# Patient Record
Sex: MALE | Race: WHITE
[De-identification: names, ages, dates, MRNs, and addresses within clinical notes are randomized per-mention and may not be internally consistent; named-entity substitution may affect disease eponyms.]

---

## 2017-09-14 ENCOUNTER — HOSPITAL ENCOUNTER (EMERGENCY)
Dept: HOSPITAL 47 - EC | Age: 72
Discharge: HOME | End: 2017-09-14
Payer: MEDICARE

## 2017-09-14 VITALS — TEMPERATURE: 98 F | HEART RATE: 72 BPM | DIASTOLIC BLOOD PRESSURE: 81 MMHG | SYSTOLIC BLOOD PRESSURE: 148 MMHG

## 2017-09-14 VITALS — RESPIRATION RATE: 18 BRPM

## 2017-09-14 DIAGNOSIS — Z91.030: ICD-10-CM

## 2017-09-14 DIAGNOSIS — Z79.01: ICD-10-CM

## 2017-09-14 DIAGNOSIS — Z87.891: ICD-10-CM
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
## 2017-09-14 DIAGNOSIS — S62.660A: Primary | ICD-10-CM

## 2017-09-14 PROCEDURE — 99283 EMERGENCY DEPT VISIT LOW MDM: CPT

## 2017-09-14 NOTE — ED
Upper Extremity HPI





- General


Chief Complaint: Extremity Injury, Upper


Stated Complaint: R hand injury


Time Seen by Provider: 09/14/17 17:31


Source: patient, RN notes reviewed, old records reviewed


Mode of arrival: ambulatory


Limitations: no limitations





- History of Present Illness


Initial Comments: 





Serum present emergency department with chief complaint of right index finger 

injury.  Patient reports that he was using a circular saw and cut the distal 

tip of his finger off.  Patient reports that he was also told he has an open 

distal phalanx fracture.  Patient was seen originally at [facility].  They did give him an IV, updated his tetanus.  He was wrapped in 

dressing and then transferred to this facility.  Patient reports that he is 

supposed to see Dr. Persaud.  Patient also relates that he is currently on 

Coumadin.  Patient reports that he has numbness and tingling to the distal 

finger.  Tonight denies any other injury to the hand.  Patient reports that he 

is left-handed.





- Related Data


 Previous Rx's











 Medication  Instructions  Recorded


 


HYDROcodone/APAP 10-325MG [Norco 1 tab PO Q6H PRN #15 tab 09/14/17





]  











 Allergies











Allergy/AdvReac Type Severity Reaction Status Date / Time


 


bee venom protein (honey bee) Allergy  Swelling Verified 09/14/17 17:26














Review of Systems


ROS Statement: 


Those systems with pertinent positive or pertinent negative responses have been 

documented in the HPI.





ROS Other: All systems not noted in ROS Statement are negative.





Past Medical History


Past Medical History: Diabetes Mellitus


History of Any Multi-Drug Resistant Organisms: None Reported


Additional Past Surgical History / Comment(s): mitral valve repair


Past Psychological History: No Psychological Hx Reported


Smoking Status: Former smoker


Past Alcohol Use History: Daily


Past Drug Use History: None Reported





General Exam





- General Exam Comments


Initial Comments: 





Is a 71-year-old male.  Patient does not appear to be in any acute distress.


Limitations: no limitations


General appearance: alert, in no apparent distress


Head exam: Present: atraumatic, normocephalic, normal inspection


Eye exam: Present: normal appearance, PERRL, EOMI.  Absent: scleral icterus, 

conjunctival injection, periorbital swelling


ENT exam: Present: normal exam, mucous membranes moist


Neck exam: Present: normal inspection.  Absent: tenderness, meningismus, 

lymphadenopathy


Respiratory exam: Present: normal lung sounds bilaterally.  Absent: respiratory 

distress, wheezes, rales, rhonchi, stridor


Cardiovascular Exam: Present: regular rate, normal rhythm, normal heart sounds.

  Absent: systolic murmur, diastolic murmur, rubs, gallop, clicks


GI/Abdominal exam: Present: soft, normal bowel sounds.  Absent: distended, 

tenderness, guarding, rebound, rigid


Extremities exam: Present: normal inspection, full ROM, normal capillary 

refill.  Absent: tenderness, pedal edema, joint swelling, calf tenderness


  ** Right


Upper Arm exam: Present: normal inspection, full ROM


Elbow exam: Present: normal inspection, full ROM


Forearm Wrist exam: Present: normal inspection, full ROM


Hand Wrist exam: Absent: normal inspection


Hand L/R Front: 


  __________________________














  __________________________





 1 - laceration (circumferential laceration, Evdince of nail bed involvement.)





Neuro motor exam: Present: wrist extension intact, thumb opposition intact, 

thumb IP flexion intact, thumb adduction intact, fingers 2-5 abduction intact


Back exam: Present: normal inspection


Neurological exam: Present: alert, oriented X3, CN II-XII intact


Psychiatric exam: Present: normal affect, normal mood


Skin exam: Present: warm, dry, intact, normal color.  Absent: rash





Course


 Vital Signs











  09/14/17





  17:21


 


Temperature 97.2 F L


 


Pulse Rate 69


 


Respiratory 18





Rate 


 


Blood Pressure 174/91


 


O2 Sat by Pulse 99





Oximetry 














Medical Decision Making





- Medical Decision Making





Physical 71-year-old male with history of diabetes, A. fib presents emergency 

Department with  right second finger laceration after he cut it on a circular 

saw.  Patient was originally seen at [facility].  He was then 

transferred to this hospital for evaluation by orthopedic hand specialist.  On 

initial transfer physician assistant for Dr. bunch was contacted and accepted 

the transfer.  Apparently the physician assistant and called the [facility] 

and [place] and told him just to come to the office tomorrow morning.  The 

patient did not know that he was supposed to just go to the office tomorrow 

morning and then was sent to the emergency department.  Upon arriving to the 

emergency department I did advise the wound.  There is a circumferential 

laceration involving the nailbed, x-rays were reviewed and show a nondisplaced 

distal phalanx fracture.  Patient received Keflex, and teeth.  Patient's wound 

was then redressed and a wet-to-dry dressing.  We did call the orthopedic 

associates, Dr. Remy stated that he is only supposed to see the office 

tomorrow morning.  He will have surgery at that time.  He wants the patient to 

hold off on his Coumadin tonight, as well as have pain medication, and be 

nothing by mouth after midnight.  I discussed all these situations with the 

patient.  They are somewhat frustrated that they had to come to the emergency 

department with outstanding orthopedic physician.  I discussed with them that 

they need to keep the hand wrapped, and all further instructions.  Patient is 

understanding of treatment plan will comply.  Return parameters were discussed.

  





Disposition


Clinical Impression: 


 Open fracture of distal phalanx, Warfarin anticoagulation





Disposition: HOME SELF-CARE


Condition: Good


Instructions:  Finger Fracture (ED)


Additional Instructions: 


Patient needs to be nothing by mouth after midnight.  Hold the Coumadin.  Take 

all other medications with a small sip of water.  Patient should call Dr. bunch's office first thing in the morning to find a 1 year appointment is.  

Return to the emergency department if any alarming signs or.  Recommended 

sleeping in a recliner chair with her hand elevated.


Prescriptions: 


HYDROcodone/APAP 10-325MG [Norco ] 1 tab PO Q6H PRN #15 tab


 PRN Reason: Pain


Referrals: 


Seth Sanchez MD [Primary Care Provider] - 1-2 days


Palmer Persaud DO [Doctor of Osteopathic Medicine] - 1-2 days


Time of Disposition: 18:28

## 2017-09-29 ENCOUNTER — HOSPITAL ENCOUNTER (OUTPATIENT)
Dept: HOSPITAL 47 - ORWHC2ENDO | Age: 72
Discharge: HOME | End: 2017-09-29
Payer: MEDICARE

## 2017-09-29 VITALS — HEART RATE: 55 BPM | SYSTOLIC BLOOD PRESSURE: 124 MMHG | DIASTOLIC BLOOD PRESSURE: 91 MMHG

## 2017-09-29 VITALS — TEMPERATURE: 98 F

## 2017-09-29 VITALS — RESPIRATION RATE: 16 BRPM

## 2017-09-29 VITALS — BODY MASS INDEX: 28.3 KG/M2

## 2017-09-29 DIAGNOSIS — H40.9: ICD-10-CM

## 2017-09-29 DIAGNOSIS — Z12.11: Primary | ICD-10-CM

## 2017-09-29 DIAGNOSIS — I10: ICD-10-CM

## 2017-09-29 DIAGNOSIS — D12.2: ICD-10-CM

## 2017-09-29 DIAGNOSIS — F17.200: ICD-10-CM

## 2017-09-29 DIAGNOSIS — Z79.01: ICD-10-CM

## 2017-09-29 DIAGNOSIS — D12.0: ICD-10-CM

## 2017-09-29 DIAGNOSIS — Z79.899: ICD-10-CM

## 2017-09-29 DIAGNOSIS — Z79.84: ICD-10-CM

## 2017-09-29 DIAGNOSIS — E78.5: ICD-10-CM

## 2017-09-29 DIAGNOSIS — I48.91: ICD-10-CM

## 2017-09-29 LAB — GLUCOSE BLD-MCNC: 156 MG/DL (ref 75–99)

## 2017-09-29 PROCEDURE — 88305 TISSUE EXAM BY PATHOLOGIST: CPT

## 2017-09-29 PROCEDURE — 45385 COLONOSCOPY W/LESION REMOVAL: CPT

## 2017-09-29 NOTE — P.PCN
Date of Procedure: 09/29/17


Procedure(s) Performed: 


BRIEF HISTORY: Patient is a 71-year-old pleasant male, scheduled for an 

elective colonoscopy as a part of screening for colorectal neoplasia.





PROCEDURE PERFORMED: Colonoscopy with snare polypectomy. 





PREOPERATIVE DIAGNOSIS: Screening for colon cancer. 





IV sedation per Anesthesia. 





PROCEDURE: After informed consent was obtained, the patient, was brought into 

the endoscopy unit. IV sedation was administered by Anesthesia under continuous 

monitoring.  Digital rectal examination was normal. Initially the Olympus CF-

160 flexible video colonoscope was then inserted in the rectum, gradually 

advanced into the cecum without any difficulty. Careful examination was 

performed as the scope was gradually being withdrawn. Ileocecal valve and the 

appendiceal orifice were visualized and appeared normal.  Prep was excellent. 

Mucosa of the cecum, appeared normal.  There was a 1 and a polyp noted in the 

base of the cecum that was removed by snare polypectomy.  In the ascending 

colon there was a 7 mm sessile polyp removed by snare polypectomy.  The rest of 

the ascending colon, transverse colon, descending colon, sigmoid colon, and 

rectum appeared normal.  In the descending colon there was a 2-3 mm diminutive 

polyp that was removed by biopsy.  Scattered sigmoid diverticula seen.  

Retroflexion was performed in the rectum and small internal hemorrhoids were 

seen. The patient tolerated the procedure well. 





IMPRESSION: 


1 cm cecal polyp status post polypectomy


7 mm ascending colon polyp status post snare polypectomy


2-3 mm diminutive descending colon polyp status post snare polypectomy


Scattered sigmoid diverticulosis.


Small internal hemorrhoids.





RECOMMENDATIONS:  Findings of this examination were discussed with the patient 

as well as his family.  He was advised to follow with the biopsy results.  The 

biopsy shows a tubular adenoma, he can have a repeat colonoscopy in 5 years.

## 2018-01-26 ENCOUNTER — HOSPITAL ENCOUNTER (OUTPATIENT)
Dept: HOSPITAL 47 - LABWHC1 | Age: 73
Discharge: HOME | End: 2018-01-26
Attending: INTERNAL MEDICINE
Payer: MEDICARE

## 2018-01-26 DIAGNOSIS — R06.02: ICD-10-CM

## 2018-01-26 DIAGNOSIS — J98.11: Primary | ICD-10-CM

## 2018-01-26 DIAGNOSIS — J90: ICD-10-CM

## 2018-01-26 LAB
ANION GAP SERPL CALC-SCNC: 10 MMOL/L
BUN SERPL-SCNC: 20 MG/DL (ref 9–20)
CALCIUM SPEC-MCNC: 9.3 MG/DL (ref 8.4–10.2)
CHLORIDE SERPL-SCNC: 97 MMOL/L (ref 98–107)
CO2 SERPL-SCNC: 30 MMOL/L (ref 22–30)
ERYTHROCYTE [DISTWIDTH] IN BLOOD BY AUTOMATED COUNT: 4.52 M/UL (ref 4.3–5.9)
ERYTHROCYTE [DISTWIDTH] IN BLOOD: 12.5 % (ref 11.5–15.5)
GLUCOSE SERPL-MCNC: 131 MG/DL (ref 74–99)
HCT VFR BLD AUTO: 43.9 % (ref 39–53)
HGB BLD-MCNC: 14.4 GM/DL (ref 13–17.5)
MCH RBC QN AUTO: 31.8 PG (ref 25–35)
MCHC RBC AUTO-ENTMCNC: 32.8 G/DL (ref 31–37)
MCV RBC AUTO: 97.1 FL (ref 80–100)
PLATELET # BLD AUTO: 121 K/UL (ref 150–450)
POTASSIUM SERPL-SCNC: 4.5 MMOL/L (ref 3.5–5.1)
SODIUM SERPL-SCNC: 137 MMOL/L (ref 137–145)
WBC # BLD AUTO: 3.9 K/UL (ref 3.8–10.6)

## 2018-01-26 PROCEDURE — 36415 COLL VENOUS BLD VENIPUNCTURE: CPT

## 2018-01-26 PROCEDURE — 85027 COMPLETE CBC AUTOMATED: CPT

## 2018-01-26 PROCEDURE — 80048 BASIC METABOLIC PNL TOTAL CA: CPT

## 2018-01-26 PROCEDURE — 83880 ASSAY OF NATRIURETIC PEPTIDE: CPT

## 2018-01-26 PROCEDURE — 71046 X-RAY EXAM CHEST 2 VIEWS: CPT

## 2018-01-26 NOTE — XR
EXAMINATION TYPE: XR chest 2V

 

DATE OF EXAM: 1/26/2018

 

COMPARISON: Prior chest x-ray November 4, 2015.

 

HISTORY: Cough and shortness of breath for 3 days

 

TECHNIQUE:  Frontal and lateral views of the chest are obtained.

 

FINDINGS: Sternal wires and mediastinal clips are redemonstrated. There is new small right pleural ef
fusion. There is new patchy right lower lung linear atelectasis. The cardiac silhouette size remains 
within normal limits with ectatic descending aorta.   The osseous structures are intact.

 

IMPRESSION:  New small right pleural effusion and right lower lung linear atelectasis

## 2018-02-06 ENCOUNTER — HOSPITAL ENCOUNTER (OUTPATIENT)
Dept: HOSPITAL 47 - RADECHMAIN | Age: 73
Discharge: HOME | End: 2018-02-06
Attending: INTERNAL MEDICINE
Payer: MEDICARE

## 2018-02-06 DIAGNOSIS — I31.3: ICD-10-CM

## 2018-02-06 DIAGNOSIS — I48.0: ICD-10-CM

## 2018-02-06 DIAGNOSIS — Z98.890: ICD-10-CM

## 2018-02-06 DIAGNOSIS — I08.1: Primary | ICD-10-CM

## 2018-02-06 PROCEDURE — 93306 TTE W/DOPPLER COMPLETE: CPT

## 2018-02-07 NOTE — ECHOF
Referral Reason:I48.0 Paroxysmal atrial fibrillation,Z98.890 Other



MEASUREMENTS

--------

HEIGHT: 162.6 cm

WEIGHT: 81.6 kg

BP: 

IVSd:   0.9 cm     (0.6 - 1.1)

LVIDd:   4.4 cm     (3.9 - 5.3)

LVPWd:   1.0 cm     (0.6 - 1.1)

IVSs:   1.4 cm

LVIDs:   3.2 cm

LVPWs:   1.6 cm

LAESV Index (A-L):   35.95 ml/m

Ao Diam:   3.4 cm     (2.0 - 3.7)

AV Cusp:   2.0 cm     (1.5 - 2.6)

LA Diam:   4.2 cm     (2.7 - 3.8)

MV EXCURSION:   13.883 mm     (> 18.000)

MV EF SLOPE:   33 mm/s     (70 - 150)

EPSS:   0.5 cm

MV E Inderjit:   1.20 m/s

MV DecT:   299 ms

MV A Inderjit:   1.78 m/s

MV E/A Ratio:   0.67 

AR PHT:   373 ms

RAP:   5.00 mmHg

RVSP:   21.00 mmHg







FINDINGS

--------

Undetermined rhythm.

This was a technically adequate study.

The left ventricular size is normal.   Left ventricular wall thickness is normal.   Overall left vent
ricular systolic function is low-normal with, an EF between 50 - 55 %.   There is paradoxical/dysyner
gic septal motion consistent with post-operative status.

The right ventricle is normal in size and function.

LA is midly dilated 29-33ml/m2.

The right atrium is normal in size.

The aortic valve is trileaflet, and appears structurally normal. No aortic stenosis or regurgitation.


Mild mitral annular calcification present.   Mild mitral regurgitation is present.   MV Repair.

Mild tricuspid regurgitation present.   The right ventricular systolic pressure, as measured by Doppl
er, is 21.00mmHg.

There is no pulmonic regurgitation present.

The aortic root size is normal.

There is a trivial pericardial effusion present.



CONCLUSIONS

--------

1. Undetermined rhythm.

2. This was a technically adequate study.

3. The left ventricular size is normal.

4. Left ventricular wall thickness is normal.

5. Overall left ventricular systolic function is low-normal with, an EF between 50 - 55 %.

6. There is paradoxical/dysynergic septal motion consistent with post-operative status.

7. LA is midly dilated 29-33ml/m2.

8. The aortic valve is trileaflet, and appears structurally normal. No aortic stenosis or regurgitati
on.

9. Mild mitral annular calcification present.

10. Mild mitral regurgitation is present.

11. MV Repair.

12. Mild tricuspid regurgitation present.

13. The right ventricular systolic pressure, as measured by Doppler, is 21.00mmHg.

14. There is no pulmonic regurgitation present.

15. The aortic root size is normal.

16. There is a trivial pericardial effusion present.





SONOGRAPHER: Bruna Jaime RDCS

## 2018-02-12 ENCOUNTER — HOSPITAL ENCOUNTER (OUTPATIENT)
Dept: HOSPITAL 47 - RADXRMAIN | Age: 73
Discharge: HOME | End: 2018-02-12
Attending: INTERNAL MEDICINE
Payer: MEDICARE

## 2018-02-12 DIAGNOSIS — J90: Primary | ICD-10-CM

## 2018-02-12 PROCEDURE — 71046 X-RAY EXAM CHEST 2 VIEWS: CPT

## 2018-02-12 NOTE — XR
EXAMINATION TYPE: XR chest 2V

 

DATE OF EXAM: 2/12/2018

 

COMPARISON: 1/26/2018

 

HISTORY: 72-year-old male shortness of breath, dyspnea

 

TECHNIQUE:  Frontal and lateral views

 

FINDINGS:  

Heart normal size. Elongation of the thoracic aorta. Median sternotomy wires are present. Moderate-si
zed right pleural effusion, increased from prior. Remaining lungs are clear.

 

 

IMPRESSION:  

Right-sided pleural effusion increased, now moderate in size.

## 2018-02-21 ENCOUNTER — HOSPITAL ENCOUNTER (OUTPATIENT)
Dept: HOSPITAL 47 - RADPROMAIN | Age: 73
Discharge: HOME | End: 2018-02-21
Attending: INTERNAL MEDICINE
Payer: MEDICARE

## 2018-02-21 VITALS — DIASTOLIC BLOOD PRESSURE: 86 MMHG | SYSTOLIC BLOOD PRESSURE: 157 MMHG

## 2018-02-21 VITALS — TEMPERATURE: 97.8 F

## 2018-02-21 VITALS — HEART RATE: 67 BPM | RESPIRATION RATE: 14 BRPM

## 2018-02-21 DIAGNOSIS — J90: Primary | ICD-10-CM

## 2018-02-21 LAB
APTT BLD: 24.9 SEC (ref 22–30)
CELL CNT PNL FLD: 100
DEPRECATED POLYS # FLD: 14 %
INR PPP: 1 (ref ?–1.2)
MONONUC CELLS # FLD: 86 %
NUC CELL # FLD: 600 /UL
PLATELET # BLD AUTO: 221 K/UL (ref 150–450)
PROT FLD-MCNC: 5300 MG/DL
PT BLD: 10 SEC (ref 9–12)

## 2018-02-21 PROCEDURE — 89050 BODY FLUID CELL COUNT: CPT

## 2018-02-21 PROCEDURE — 85610 PROTHROMBIN TIME: CPT

## 2018-02-21 PROCEDURE — 87205 SMEAR GRAM STAIN: CPT

## 2018-02-21 PROCEDURE — 32555 ASPIRATE PLEURA W/ IMAGING: CPT

## 2018-02-21 PROCEDURE — 88341 IMHCHEM/IMCYTCHM EA ADD ANTB: CPT

## 2018-02-21 PROCEDURE — 88108 CYTOPATH CONCENTRATE TECH: CPT

## 2018-02-21 PROCEDURE — 85730 THROMBOPLASTIN TIME PARTIAL: CPT

## 2018-02-21 PROCEDURE — 84157 ASSAY OF PROTEIN OTHER: CPT

## 2018-02-21 PROCEDURE — 88305 TISSUE EXAM BY PATHOLOGIST: CPT

## 2018-02-21 PROCEDURE — 88342 IMHCHEM/IMCYTCHM 1ST ANTB: CPT

## 2018-02-21 PROCEDURE — 83615 LACTATE (LD) (LDH) ENZYME: CPT

## 2018-02-21 PROCEDURE — 85049 AUTOMATED PLATELET COUNT: CPT

## 2018-02-21 PROCEDURE — 87070 CULTURE OTHR SPECIMN AEROBIC: CPT

## 2018-02-21 PROCEDURE — 87075 CULTR BACTERIA EXCEPT BLOOD: CPT

## 2018-02-21 PROCEDURE — 71045 X-RAY EXAM CHEST 1 VIEW: CPT

## 2018-02-21 NOTE — XR
EXAMINATION TYPE: XR chest 1V

 

DATE OF EXAM: 2/21/2018

 

COMPARISON: Prior chest x-ray 2/12/2018

 

HISTORY: Status post right thoracentesis

 

TECHNIQUE: Single frontal view of the chest is obtained.

 

FINDINGS:  Persistent abnormal increased density present at the right lung base obscuring the hemidia
phragm and right heart border is not appreciably changed. No evident pneumothorax. Patient is post me
ada sternotomy.

 

IMPRESSION:  No evident complication status post right thoracentesis.

## 2018-02-23 ENCOUNTER — HOSPITAL ENCOUNTER (OUTPATIENT)
Dept: HOSPITAL 47 - RADCTMAIN | Age: 73
Discharge: HOME | End: 2018-02-23
Attending: INTERNAL MEDICINE
Payer: MEDICARE

## 2018-02-23 DIAGNOSIS — I71.2: ICD-10-CM

## 2018-02-23 DIAGNOSIS — J98.11: ICD-10-CM

## 2018-02-23 DIAGNOSIS — J90: Primary | ICD-10-CM

## 2018-02-23 DIAGNOSIS — Z98.890: ICD-10-CM

## 2018-02-23 LAB — BUN SERPL-SCNC: 19 MG/DL (ref 9–20)

## 2018-02-23 PROCEDURE — 84520 ASSAY OF UREA NITROGEN: CPT

## 2018-02-23 PROCEDURE — 71260 CT THORAX DX C+: CPT

## 2018-02-23 PROCEDURE — 36415 COLL VENOUS BLD VENIPUNCTURE: CPT

## 2018-02-23 PROCEDURE — 82565 ASSAY OF CREATININE: CPT

## 2018-02-26 NOTE — CT
EXAMINATION TYPE: CT chest w con

 

DATE OF EXAM: 2/23/2018

 

COMPARISON: Chest x-ray 2/21/2018

 

HISTORY: SOB.

 

CT DLP: 421.9 mGycm

Automated exposure control for dose reduction was used.

 

CONTRAST: 

CT scan of the chest is performed with IV Contrast, patient injected with 100ml mL of Omnipaque 300.

 

FINDINGS:

 

LUNGS: The pleural effusion has enlarged. There is associated atelectatic lung. There is a mass prese
nt within the right lower lobe measuring approximately 4.6 cm in size. Left lung is clear. There are 
coronary artery calcifications present. Patient is post median sternotomy. No filling defect evident 
within the central pulmonary arteries to suggest pulmonary embolism.

 

MEDIASTINUM: Right hilar adenopathy is present. No pericardial effusion is seen.  

 

AORTA:  Ascending aorta is borderline aneurysmal at 4 cm. 

OTHER:  Indeterminate hypodensity left lobe liver axial image 55 measures 14 mm, additional low dense
 focus present on axial image 58 approximately same size. Indeterminate low-attenuation focus adjacen
t to the splenic artery on axial image 58 and 59 measures 2.6 cm and is indeterminate. Diverticular c
hanges associated with the colon.

 

IMPRESSION:  Findings suspicious for bronchogenic carcinoma, recommend pulmonary consult. Enlarging r
ight pleural effusion and associated atelectasis. Ascending aortic aneurysm as described. Postop delgado
ges.

 

A Yellow message has been communicated to Seth Sanchez MD via the [employer] | Critical Result sy
stem on 2/23/2018 4:26 PM, Message ID [id number].

## 2018-02-28 ENCOUNTER — HOSPITAL ENCOUNTER (OUTPATIENT)
Dept: HOSPITAL 47 - RADUSMAIN | Age: 73
Discharge: HOME | End: 2018-02-28
Attending: INTERNAL MEDICINE
Payer: MEDICARE

## 2018-02-28 VITALS — HEART RATE: 80 BPM | DIASTOLIC BLOOD PRESSURE: 82 MMHG | SYSTOLIC BLOOD PRESSURE: 141 MMHG

## 2018-02-28 VITALS — TEMPERATURE: 98.1 F

## 2018-02-28 VITALS — RESPIRATION RATE: 18 BRPM

## 2018-02-28 DIAGNOSIS — C34.90: ICD-10-CM

## 2018-02-28 DIAGNOSIS — J90: Primary | ICD-10-CM

## 2018-02-28 PROCEDURE — 82947 ASSAY GLUCOSE BLOOD QUANT: CPT

## 2018-02-28 PROCEDURE — 71045 X-RAY EXAM CHEST 1 VIEW: CPT

## 2018-02-28 PROCEDURE — 36415 COLL VENOUS BLD VENIPUNCTURE: CPT

## 2018-02-28 PROCEDURE — 32555 ASPIRATE PLEURA W/ IMAGING: CPT

## 2018-02-28 NOTE — XR
EXAMINATION TYPE: XR chest 1V

 

DATE OF EXAM: 2/28/2018

 

HISTORY: Right-sided thoracentesis.

 

COMPARISON: 2/21/2018

 

TECHNIQUE: Single view of the chest is submitted.

 

FINDINGS:

Demonstrated are scattered senescent parenchymal change.  

 

Right lower lobe effusion, infiltrate and/or atelectasis persists. No evidence for pneumothorax.

 

The heart is stable.

 

Hilar and mediastinal structures are within normal limits.  

 

Degenerative changes are seen of the dorsal spine. 

 

 IMPRESSION: 

 

1.  Diminution right-sided effusion.

## 2018-02-28 NOTE — US
EXAMINATION TYPE: US thoracentesis

 

DATE OF EXAM: 2/28/2018

 

COMPARISON: NONE

 

HISTORY: Pleural effusion.

 

FINDINGS: Maximal barrier technique was utilized.  The skin overlying a suitable pocket of fluid was 
localized and the overlying skin prepped and draped.  Lidocaine was used for local anesthesia. Ultras
ound was used with sterile technique.  A 6 [place] catheter over guide needle was advanced into the pl
eural fluid collection using ultrasound guidance and the catheter advanced, needle removed.  Approxim
ately 2.5 liter(s) of serous sanguinous fluid was removed.  Catheter was withdrawn and hemostasis ach
ieved.  There is no immediate complication.  The patient discharged in stable condition without compl
ication.

 

IMPRESSION: STATUS POST ULTRASOUND GUIDED THORACENTESIS, POST PROCEDURE CHEST X-RAY PENDING.  THIS OR
OCEDURE WAS PERFORMED BY THE UNDERSIGNED.

## 2018-03-03 ENCOUNTER — HOSPITAL ENCOUNTER (OUTPATIENT)
Dept: HOSPITAL 47 - RADPETMAIN | Age: 73
Discharge: HOME | End: 2018-03-03
Payer: MEDICARE

## 2018-03-03 DIAGNOSIS — R91.8: ICD-10-CM

## 2018-03-03 DIAGNOSIS — C79.51: Primary | ICD-10-CM

## 2018-03-03 DIAGNOSIS — J90: ICD-10-CM

## 2018-03-03 PROCEDURE — 78815 PET IMAGE W/CT SKULL-THIGH: CPT

## 2018-03-04 NOTE — PE
EXAMINATION TYPE: PET CT fusion skull to thigh

 

DATE OF EXAM: 3/3/2018

 

COMPARISON: CT chest 2/23/2018.

                            Prior PET/CT: None

 

HISTORY: Solitary pulmonary nodule right lung   

 

TECHNIQUE:  Following the intravenous administration of 13.75 mCi of F-18 FDG, whole body images are 
performed from the skull base to the midthigh.  Images are reviewed on the computer in the coronal, a
xial, and sagittal planes.  Reconstructed rotating images are created on independent workstation and 
reviewed on the computer.   A localization and attenuation correction CT is performed in conjunction 
with the PET scan.

 

DLP: 419.06 mGycm

 

SCAN: Initial

 

Blood glucose: 125 mg/dL

 

Average Mediastinum SUV: 1.64

Average Liver SUV: 2.36

 

FINDINGS: 

NECK:  There is a focus of radiotracer accumulation within the spinous process of C3 suspicious for m
etastatic lesion.

 

THORAX: There is intense activity in the right infrahilar region with an SUV value of 6.4 compatible 
with neoplasm. Some extension to the infrahilar region with an SUV value measuring 4.06 may be presen
t. Mediastinal lymphadenopathy is not otherwise identified.

 

ABDOMEN: No abnormal intra-abdominal uptake

 

PELVIS: No abnormal pelvic uptake

 

OSSEOUS STRUCTURES: Uptake within the posterior spinous process of C3. Uptake within the left lateral
 eighth rib. There is a focus of radiotracer accumulation within the anterior left femoral head with 
an SUV value of 4.9 suspicious for metastatic disease. Small focus of increased radiotracer accumulat
ion is in the posterior lateral right ischial ramus measuring 1.65. This is intermediate but is suspi
cious for neoplasm. There is a focus of radiotracer accumulation just medial to the proximal left fem
ur with an SUV value 2.3.

 

LOCALIZATION CT: Large pleural effusion is present. Consolidations in the infrahilar region on the ri
ght which corresponds to the focal area of uptake suspicious for mass. Coronary artery calcification 
is noted. The ascending thoracic aorta at the level of main pulmonary artery measures 4.2 cm. The misael
n pulmonary artery bifurcation measures 3.3 cm. Diverticulosis without acute diverticulitis within th
e sigmoid colon region.

 

COMPARISON: Pleural effusion appears stable. The consolidation or mass in the infrahilar region on th
e right was present previously.

 

IMPRESSION:

1. Increased uptake within the infrahilar mass on the right lung with a moderate size pleural effusio
n. Findings are suspicious for neoplasm.

2. There are scattered osseous metastasis including posterior spinous process C3 cervical spine, righ
t eighth rib laterally, and right acetabulum.

## 2018-03-05 ENCOUNTER — HOSPITAL ENCOUNTER (OUTPATIENT)
Dept: HOSPITAL 47 - OR | Age: 73
Discharge: HOME HEALTH | End: 2018-03-05
Payer: MEDICARE

## 2018-03-05 VITALS — DIASTOLIC BLOOD PRESSURE: 77 MMHG | SYSTOLIC BLOOD PRESSURE: 136 MMHG | HEART RATE: 56 BPM

## 2018-03-05 VITALS — TEMPERATURE: 97.6 F

## 2018-03-05 VITALS — BODY MASS INDEX: 27.9 KG/M2

## 2018-03-05 VITALS — RESPIRATION RATE: 20 BRPM

## 2018-03-05 DIAGNOSIS — E11.9: ICD-10-CM

## 2018-03-05 DIAGNOSIS — J91.0: ICD-10-CM

## 2018-03-05 DIAGNOSIS — Z79.899: ICD-10-CM

## 2018-03-05 DIAGNOSIS — R91.8: ICD-10-CM

## 2018-03-05 DIAGNOSIS — I48.91: ICD-10-CM

## 2018-03-05 DIAGNOSIS — C79.9: ICD-10-CM

## 2018-03-05 DIAGNOSIS — Z79.82: ICD-10-CM

## 2018-03-05 DIAGNOSIS — Z79.01: ICD-10-CM

## 2018-03-05 DIAGNOSIS — Z95.1: ICD-10-CM

## 2018-03-05 DIAGNOSIS — Z87.891: ICD-10-CM

## 2018-03-05 DIAGNOSIS — Z79.2: ICD-10-CM

## 2018-03-05 DIAGNOSIS — I48.2: ICD-10-CM

## 2018-03-05 DIAGNOSIS — Z79.84: ICD-10-CM

## 2018-03-05 DIAGNOSIS — Z88.0: ICD-10-CM

## 2018-03-05 DIAGNOSIS — J93.9: ICD-10-CM

## 2018-03-05 DIAGNOSIS — C34.90: Primary | ICD-10-CM

## 2018-03-05 PROCEDURE — 71045 X-RAY EXAM CHEST 1 VIEW: CPT

## 2018-03-05 PROCEDURE — 71046 X-RAY EXAM CHEST 2 VIEWS: CPT

## 2018-03-05 PROCEDURE — 32551 INSERTION OF CHEST TUBE: CPT

## 2018-03-05 NOTE — OP
OPERATIVE REPORT



DATE OF THE SURGERY:

03/05/2018.



SURGEON:

Dr. Jennifer Mac.



ANESTHESIA:

Local anesthesia with IV sedation.



PREOPERATIVE DIAGNOSIS:

Recurrent malignant right pleural effusion.



POSTOPERATIVE DIAGNOSIS:

Recurrent malignant right pleural effusion.



PROCEDURE:

Insertion of a right PleurX catheter.



INDICATION FOR PROCEDURE:

The patient is a 72-year-old gentleman with a recently diagnosed metastatic

adenocarcinoma and malignant right pleural effusion who underwent 2 ultrasound-guided

thoracentesis.  His fluid is reaccumulating rapidly.  His oncological workup is in

process and he just underwent a PET-CT scan.  He is known to have right lung mass and

what seems to be metastasis to the bone.  We at this point are planning a right PleurX

insertion.  Risks, benefits, and alternatives were discussed with him and his family.

They understood them and agreed to proceed.



DESCRIPTION OF THE PROCEDURE:

The patient in supine position.  IV sedation was administered.  He received 2 grams of

cefazolin intravenously.  Sequential compression stockings were applied to both lower

extremities.  After proper timeout, the chest and abdomen were prepped and draped using

ChloraPrep.



Initially and after securing local anesthesia with lidocaine 1%, a finding needle

confirmed a good position for accessing the intrapleural fluid.  Once this was

established, we inserted a wire into the right pleural cavity using a Seldinger

technique.  Subsequently, we secured local anesthesia of a tunnel between that access

point and the right upper quadrant and a total of 10 mL of lidocaine 1% was used.  A

counter incision was made at that level and a PleurX catheter tunneled from the right

upper quadrant to the access point.  A dilator over a wire then a dilator and sheath

were used to dilate the access to the pleural cavity.  Subsequently, the dilator and

the wires were removed and the PleurX catheter pushed into the peel-away sheath, which

was removed totally as we were pushing the catheter in.  The catheter sat nicely

without any kink.  The catheter was connected to a vacuum bottle and initially 1 L of

deandra effusion under the pressure was retrieved.  We stopped at this level as we want

to show the family how to use the system and I left intentionally some fluid behind.

The access incision was closed with a Vicryl 4-0 two sutures and skin glue.  The

catheter was affixed to the skin with silk 2-0.  The patient tolerated the procedure

well and was transferred to the recovery room in stable condition.





MMODL / IJN: [id number] / Job#: [id number]

## 2018-03-05 NOTE — XR
EXAMINATION TYPE: XR chest 1V portable

 

DATE OF EXAM: 3/5/2018

 

COMPARISON: Chest x-ray earlier today and older studies.

 

 HISTORY: Pleural catheter insertion.

 

TECHNIQUE: Single AP portable frontal upright view of the chest is obtained.

 

FINDINGS:  There is new right basilar chest tube with interval improvement in right-sided pleural eff
usion. There is new small right pneumothorax in the lateral base and apex. There is associated right 
basilar atelectasis and/or infiltrate. Obstructing right hilar mass or neoplasm is not well seen. Lef
t lung remains clear. The cardiac silhouette size is enlarged. Overlying sternal wires are redemonstr
ated. No mediastinal shift is noted.  The osseous structures are intact.

 

IMPRESSION:  New right basilar chest tube with interval improvement in right-sided pleural effusion. 
There is new small right pneumothorax noted.

## 2018-03-05 NOTE — XR
EXAMINATION TYPE: XR chest 2V

 

DATE OF EXAM: 3/5/2018

 

COMPARISON: CT chest February 23, 2018. PET CT March 3, 2018. Chest x-ray February 28, 2018

 

HISTORY: Presurgical study.

 

TECHNIQUE:  Frontal and lateral views of the chest are obtained.

 

FINDINGS:  Sternal wires are redemonstrated. There is persistent moderate to large size right-sided p
leural effusion. There is associated right midlung atelectasis. Left lung is clear. No mediastinal sh
ift is seen. Central hypermetabolic area on recent PET/CT favors obstructing neoplasm. Cardiac silhou
ette size is stable and within normal limits. Lower left lateral rib fracture on recent PET CT is les
s well seen on plain film.

 

IMPRESSION:  Persistent moderate to large size right pleural effusion with associated right midlung c
ompressive atelectasis. No mediastinal shift. Central obstructing hypermetabolic mass or neoplasm not
ed seen better on recent PET/CT.

## 2018-03-07 ENCOUNTER — HOSPITAL ENCOUNTER (OUTPATIENT)
Dept: HOSPITAL 47 - RADXRMAIN | Age: 73
Discharge: HOME | End: 2018-03-07
Attending: INTERNAL MEDICINE
Payer: MEDICARE

## 2018-03-07 DIAGNOSIS — M47.812: Primary | ICD-10-CM

## 2018-03-07 DIAGNOSIS — M99.71: ICD-10-CM

## 2018-03-07 DIAGNOSIS — M25.551: ICD-10-CM

## 2018-03-07 PROCEDURE — 73502 X-RAY EXAM HIP UNI 2-3 VIEWS: CPT

## 2018-03-07 PROCEDURE — 72050 X-RAY EXAM NECK SPINE 4/5VWS: CPT

## 2018-03-07 NOTE — XR
EXAMINATION TYPE: XR Hip Complete RT

 

DATE OF EXAM: 3/7/2018

 

CLINICAL HISTORY: Abnormal PET/CT

 

TECHNIQUE:  AP and frogleg views of the right hip are obtained.

 

COMPARISON: PET/CT 3/3/2018

 

FINDINGS:  There is no acute fracture/dislocation evident in the right hip.  The joint space in the r
ight hip appears within normal limits.  The overlying soft tissue appears unremarkable. No suspicious
 lytic or sclerotic lesions are identified. Abnormality corresponding to the PET scan is not identifi
ed. Hypermetabolic activity may be more sensitive on the PET scan on plain film.

 

IMPRESSION:  There is no acute fracture or dislocation in the right hip. Osseous metastasis is not id
entified.

## 2018-03-07 NOTE — XR
EXAMINATION TYPE: XR cervical spine comp

 

DATE OF EXAM: 3/7/2018

 

COMPARISON: NONE

 

HISTORY: Hip and neck pain history of lung cancer

 

TECHNIQUE: 5 view cervical spine

 

FINDINGS: Carotid artery bifurcation calcification is noted. There is foraminal narrowing to moderate
 degree at C5-6 on the right and C6-7 on the right. Milder foraminal narrowing is present C5-6 C6-7 o
n the left. Anterior vertebral body spurring is present. There is degenerative disc changes throughou
t the cervical spine. Posterior spinal lamellar line is intact. The prevertebral space is normal. Odo
ntoid has limitation with overlying occiput.

 

IMPRESSION:

1.  Degenerative disc changes.

2. Foraminal narrowing within the lower cervical spine at C5-6 C6-7.

## 2018-06-09 ENCOUNTER — HOSPITAL ENCOUNTER (OUTPATIENT)
Dept: HOSPITAL 47 - RADPETMAIN | Age: 73
Discharge: HOME | End: 2018-06-09
Payer: MEDICARE

## 2018-06-09 DIAGNOSIS — C34.81: Primary | ICD-10-CM

## 2018-06-09 PROCEDURE — 78815 PET IMAGE W/CT SKULL-THIGH: CPT

## 2018-06-11 NOTE — PE
EXAMINATION TYPE: PET CT fusion skull to thigh

 

DATE OF EXAM: 6/9/2018

 

COMPARISON: Prior PET/CT March 3, 2018. Prior chest CT February 23, 2018.

 

HISTORY: Lung cancer progress study completed chemotherapy May 31, 2018.   

 

TECHNIQUE:  Following the intravenous administration of 15.3 mCi of F-18 FDG, whole body images are p
erformed from the skull base to the midthigh.  Images are reviewed on the computer in the coronal, ax
ial, and sagittal planes.  Reconstructed rotating images are created on independent workstation and r
eviewed on the computer.   A noncontrast CT is performed in conjunction with the PET scan.

 

SCAN: Subsequent Scan

 

FINDINGS: 

 

SKULL BASE AND NECK:  No new areas of suspicious hypermetabolic uptake are seen.

 

CHEST, MEDIASTINUM, AND HILAR REGION: There is small to tiny right pleural effusion with new percutan
eous drainage catheter inferiorly that does not show abnormal hypermetabolic uptake markedly improved
 in size from prior studies. There is residual hypermetabolic nodule abutting effusion superior poste
rior right lower lobe axial image 101 measuring 2.4 x 1.8 cm on current study, max SUV is 3.38, dimin
ished in size and hypermetabolic uptake from prior. There is smaller nodule measuring 1.4 x 0.8 cm ax
ial image 107 right lower lobe posteriorly which does not show hypermetabolic uptake currently. No ne
w areas of abnormal hypermetabolic uptake are seen.

 

ABDOMEN AND PELVIS: No suspicious new areas of abnormal hypermetabolic uptake are seen. Diffuse bowel
 and bladder uptake is noted. 

 

OSSEOUS STRUCTURES: No suspicious areas of hypermetabolic uptake identified on current study. Areas o
f osseous metastatic disease on prior do not show hypermetabolic uptake currently.

 

OTHER CT: There is mild mucosal thickening in the inferior aspect bilateral maxillary sinuses.

 

There is mild to moderate atherosclerotic plaque in the bilateral carotid bulbs.

 

Coronary artery calcification is redemonstrated. Sternotomy wires are noted. Cardiomegaly is redemons
trated. Small degree of bilateral gynecomastia is again seen.

 

Diverticula throughout the colon most prominent in the left and sigmoid colon is redemonstrated.

 

There is multilevel spurring in the thoracolumbar spine. There is facet arthropathy lower lumbar leve
ls.

 

There is moderate calcified plaque of aorta extending into branch vessels.

 

Prostate gland is enlarged in size bulging on bladder base, underlying BPH is felt present.

 

IMPRESSION: Positive treatment response with improvement in right infrahilar mass and hypermetabolic 
uptake. Interval resolution of hypermetabolic uptake in suspected osseous metastatic disease. Diminis
hed size to right-sided effusion with percutaneous drainage catheter noted. No new areas of abnormal 
hypermetabolic uptake are seen.

 

EORTC response criteria: Partial Metabolic Response. Decrease in MaxSUV >= 25% 

 

Primary tumor response WHO category: [place] - At least 30% decrease in longest recordable dimension of tu
mor

## 2018-07-27 ENCOUNTER — HOSPITAL ENCOUNTER (OUTPATIENT)
Dept: HOSPITAL 47 - OR | Age: 73
Discharge: HOME | End: 2018-07-27
Attending: THORACIC SURGERY (CARDIOTHORACIC VASCULAR SURGERY)
Payer: MEDICARE

## 2018-07-27 VITALS — SYSTOLIC BLOOD PRESSURE: 150 MMHG | DIASTOLIC BLOOD PRESSURE: 78 MMHG

## 2018-07-27 VITALS — RESPIRATION RATE: 16 BRPM

## 2018-07-27 VITALS — HEART RATE: 45 BPM

## 2018-07-27 VITALS — TEMPERATURE: 97.5 F

## 2018-07-27 VITALS — BODY MASS INDEX: 27.9 KG/M2

## 2018-07-27 DIAGNOSIS — Z45.2: ICD-10-CM

## 2018-07-27 DIAGNOSIS — Z79.82: ICD-10-CM

## 2018-07-27 DIAGNOSIS — Z87.891: ICD-10-CM

## 2018-07-27 DIAGNOSIS — I34.1: ICD-10-CM

## 2018-07-27 DIAGNOSIS — Z79.899: ICD-10-CM

## 2018-07-27 DIAGNOSIS — I48.2: ICD-10-CM

## 2018-07-27 DIAGNOSIS — Z79.84: ICD-10-CM

## 2018-07-27 DIAGNOSIS — J91.0: ICD-10-CM

## 2018-07-27 DIAGNOSIS — E11.9: ICD-10-CM

## 2018-07-27 DIAGNOSIS — I10: ICD-10-CM

## 2018-07-27 DIAGNOSIS — C34.90: Primary | ICD-10-CM

## 2018-07-27 DIAGNOSIS — Z91.030: ICD-10-CM

## 2018-07-27 DIAGNOSIS — E78.5: ICD-10-CM

## 2018-07-27 LAB — GLUCOSE BLD-MCNC: 152 MG/DL (ref 75–99)

## 2018-07-27 PROCEDURE — 36590 REMOVAL TUNNELED CV CATH: CPT

## 2018-09-08 ENCOUNTER — HOSPITAL ENCOUNTER (OUTPATIENT)
Dept: HOSPITAL 47 - RADPETMAIN | Age: 73
Discharge: HOME | End: 2018-09-08
Attending: INTERNAL MEDICINE
Payer: MEDICARE

## 2018-09-08 DIAGNOSIS — C34.31: Primary | ICD-10-CM

## 2018-09-08 DIAGNOSIS — I71.2: ICD-10-CM

## 2018-09-08 PROCEDURE — 78815 PET IMAGE W/CT SKULL-THIGH: CPT

## 2018-09-09 NOTE — PE
EXAMINATION TYPE: PET CT fusion skull to thigh

 

DATE OF EXAM: 9/8/2018

 

COMPARISON: No recent CT examinations., Previous CT 2/23/2018.

                            Prior PET/CT: 6/9/2018

 

HISTORY: Lung cancer   

 

TECHNIQUE:  Following the intravenous administration of 12.53 mCi of F-18 FDG, whole body images are 
performed from the skull base to the midthigh.  Images are reviewed on the computer in the coronal, a
xial, and sagittal planes.  Reconstructed rotating images are created on independent workstation and 
reviewed on the computer.   A localization and attenuation correction CT is performed in conjunction 
with the PET scan.

 

DLP: 368.93 mGycm

 

SCAN: Subsequent

 

Blood glucose: 154 mg/dL

 

Average Mediastinum SUV: 1.68

Average Liver SUV: 2.49

 

FINDINGS: 

NECK:  No abnormal uptake

 

THORAX: There is mild focal uptake within a right rib measuring 1.2 SUV. This is indeterminant. Howev
er, within the osseous structure this should be viewed with suspicion for metastatic lesion.

 

There is a large marked area of increased uptake within the posterior right lung with an SUV value of
 7.58 compatible with the patient's lung cancer. There is an infrahilar lymph node with mild uptake m
easuring 2.1 suspicious for metastatic lesion. PET image 100.

 

Within the subcutaneous tissues lateral to the right lung there is some mild inflammatory change with
 an SUV of value 1.21. PET image 118.

 

ABDOMEN: No abnormal uptake

 

PELVIS: There is some faint uptake posterior lateral to the left psoas muscle with an SUV value 1.0. 
This is of uncertain etiology. PET image 170.

 

OSSEOUS STRUCTURES: There is a focal radiotracer cannulation within the right lumbar spine level L3 c
ompatible with a metastatic lesion. There is a focal radiotracer accumulation within the left sacral 
ala with an SUV value of 5.87 compatible with prostatic disease. Within the medial left iliac wing ad
jacent is an additional focus of radiotracer measuring 2.27 suspicious for metastatic disease. There 
is an additional lesion slightly more inferior within the left iliac wing adjacent to the sacroiliac 
joint measuring 4.88 compatible with a metastatic lesion. There is a focus of radiotracer above the r
ight acetabulum compatible with a metastatic lesion measuring 4.37 SUV.

 

LOCALIZATION CT: Ascending thoracic aorta is 4.5 cm. The main pulmonary artery bifurcation is 2.7 cm.
 No suspicious mediastinal adenopathy is evident. Small shotty lymph nodes are present. Coronary taylor
ry calcifications present. A small right pleural effusion is present. The lung mass measures 3.2 x 2.
2 cm on mediastinal windows within the posterior medial right lung base. There is a hypodensity withi
n the liver likely is a hepatic cyst without abnormal uptake. This appears slightly hypointense in re
lation to the remaining portions of the liver. This measures 1.3 cm and 12 Hounsfield units. Scleroti
c lesion within the posterior lateral right ischio ramus does not have abnormal uptake.

 

COMPARISON: Pleural effusion is significantly diminished. Density in the posterior medial right lung 
base is better identified on the current examination. Suspicious hepatic lesion is not identified cur
rently. Previous spinous process cervical spine uptake is not identified currently. The lung mass in 
the posterior medial right lung base is diminished in size. Pleural effusion is diminished in size. P
revious left femoral head uptake is not identified. Uptake within the pelvis has changed with new or 
larger lesions within the posterior sacrum and medial left iliac wing. Uptake within the lumbar spine
 is also changed or is new.

 

IMPRESSION:

1. Diminished size of the posterior medial right lung base neoplasm continued uptake with diminished 
SUV value currently compared to previous.

2. New metastatic osseous lesions discussed above. Right rib uptake is suspicious for metastatic lesi
on. Some previous osseous metastatic lesions no longer have abnormal radiotracer uptake.

3. Ascending thoracic aortic aneurysm of 4.5 cm.

## 2018-11-10 ENCOUNTER — HOSPITAL ENCOUNTER (INPATIENT)
Dept: HOSPITAL 47 - EC | Age: 73
LOS: 7 days | Discharge: HOME | DRG: 205 | End: 2018-11-17
Attending: INTERNAL MEDICINE | Admitting: INTERNAL MEDICINE
Payer: MEDICARE

## 2018-11-10 VITALS — BODY MASS INDEX: 26.9 KG/M2

## 2018-11-10 DIAGNOSIS — D63.0: ICD-10-CM

## 2018-11-10 DIAGNOSIS — Z82.3: ICD-10-CM

## 2018-11-10 DIAGNOSIS — Z82.5: ICD-10-CM

## 2018-11-10 DIAGNOSIS — Z82.49: ICD-10-CM

## 2018-11-10 DIAGNOSIS — T45.1X5A: ICD-10-CM

## 2018-11-10 DIAGNOSIS — E78.5: ICD-10-CM

## 2018-11-10 DIAGNOSIS — Z79.01: ICD-10-CM

## 2018-11-10 DIAGNOSIS — Z87.891: ICD-10-CM

## 2018-11-10 DIAGNOSIS — J96.01: ICD-10-CM

## 2018-11-10 DIAGNOSIS — I48.0: ICD-10-CM

## 2018-11-10 DIAGNOSIS — J70.2: Primary | ICD-10-CM

## 2018-11-10 DIAGNOSIS — Z91.030: ICD-10-CM

## 2018-11-10 DIAGNOSIS — Z79.899: ICD-10-CM

## 2018-11-10 DIAGNOSIS — T38.0X5A: ICD-10-CM

## 2018-11-10 DIAGNOSIS — D64.81: ICD-10-CM

## 2018-11-10 DIAGNOSIS — H40.9: ICD-10-CM

## 2018-11-10 DIAGNOSIS — Z79.82: ICD-10-CM

## 2018-11-10 DIAGNOSIS — E22.2: ICD-10-CM

## 2018-11-10 DIAGNOSIS — Z99.81: ICD-10-CM

## 2018-11-10 DIAGNOSIS — Z79.84: ICD-10-CM

## 2018-11-10 DIAGNOSIS — C79.51: ICD-10-CM

## 2018-11-10 DIAGNOSIS — E11.65: ICD-10-CM

## 2018-11-10 DIAGNOSIS — E11.42: ICD-10-CM

## 2018-11-10 DIAGNOSIS — I11.0: ICD-10-CM

## 2018-11-10 DIAGNOSIS — J96.02: ICD-10-CM

## 2018-11-10 DIAGNOSIS — Z89.021: ICD-10-CM

## 2018-11-10 DIAGNOSIS — Z83.3: ICD-10-CM

## 2018-11-10 DIAGNOSIS — R04.0: ICD-10-CM

## 2018-11-10 DIAGNOSIS — Z79.1: ICD-10-CM

## 2018-11-10 DIAGNOSIS — C34.92: ICD-10-CM

## 2018-11-10 DIAGNOSIS — I50.9: ICD-10-CM

## 2018-11-10 LAB
ALBUMIN SERPL-MCNC: 3 G/DL (ref 3.5–5)
ALP SERPL-CCNC: 93 U/L (ref 38–126)
ALT SERPL-CCNC: 42 U/L (ref 21–72)
ANION GAP SERPL CALC-SCNC: 10 MMOL/L
APTT BLD: 27.3 SEC (ref 22–30)
AST SERPL-CCNC: 50 U/L (ref 17–59)
BASOPHILS # BLD AUTO: 0 K/UL (ref 0–0.2)
BASOPHILS NFR BLD AUTO: 0 %
BUN SERPL-SCNC: 16 MG/DL (ref 9–20)
CALCIUM SPEC-MCNC: 8.5 MG/DL (ref 8.4–10.2)
CHLORIDE SERPL-SCNC: 93 MMOL/L (ref 98–107)
CK SERPL-CCNC: 90 U/L (ref 55–170)
CO2 SERPL-SCNC: 24 MMOL/L (ref 22–30)
EOSINOPHIL # BLD AUTO: 0.1 K/UL (ref 0–0.7)
EOSINOPHIL NFR BLD AUTO: 1 %
ERYTHROCYTE [DISTWIDTH] IN BLOOD BY AUTOMATED COUNT: 3.67 M/UL (ref 4.3–5.9)
ERYTHROCYTE [DISTWIDTH] IN BLOOD: 14 % (ref 11.5–15.5)
GLUCOSE SERPL-MCNC: 177 MG/DL (ref 74–99)
HCT VFR BLD AUTO: 33.4 % (ref 39–53)
HGB BLD-MCNC: 11.3 GM/DL (ref 13–17.5)
HYALINE CASTS UR QL AUTO: 1 /LPF (ref 0–2)
INR PPP: 1.1 (ref ?–1.2)
LYMPHOCYTES # SPEC AUTO: 0.4 K/UL (ref 1–4.8)
LYMPHOCYTES NFR SPEC AUTO: 10 %
MAGNESIUM SPEC-SCNC: 1.8 MG/DL (ref 1.6–2.3)
MCH RBC QN AUTO: 30.8 PG (ref 25–35)
MCHC RBC AUTO-ENTMCNC: 33.9 G/DL (ref 31–37)
MCV RBC AUTO: 91.1 FL (ref 80–100)
MONOCYTES # BLD AUTO: 0.3 K/UL (ref 0–1)
MONOCYTES NFR BLD AUTO: 7 %
NEUTROPHILS # BLD AUTO: 3.1 K/UL (ref 1.3–7.7)
NEUTROPHILS NFR BLD AUTO: 79 %
PH UR: 6 [PH] (ref 5–8)
PLATELET # BLD AUTO: 189 K/UL (ref 150–450)
POTASSIUM SERPL-SCNC: 4.8 MMOL/L (ref 3.5–5.1)
PROT SERPL-MCNC: 5.8 G/DL (ref 6.3–8.2)
PROT UR QL: (no result)
PT BLD: 10.9 SEC (ref 9–12)
SODIUM SERPL-SCNC: 127 MMOL/L (ref 137–145)
SP GR UR: 1.01 (ref 1–1.03)
UROBILINOGEN UR QL STRIP: 3 MG/DL (ref ?–2)
WBC # BLD AUTO: 3.9 K/UL (ref 3.8–10.6)
WBC #/AREA URNS HPF: 1 /HPF (ref 0–5)

## 2018-11-10 PROCEDURE — 85610 PROTHROMBIN TIME: CPT

## 2018-11-10 PROCEDURE — 84443 ASSAY THYROID STIM HORMONE: CPT

## 2018-11-10 PROCEDURE — 85730 THROMBOPLASTIN TIME PARTIAL: CPT

## 2018-11-10 PROCEDURE — 93306 TTE W/DOPPLER COMPLETE: CPT

## 2018-11-10 PROCEDURE — 81001 URINALYSIS AUTO W/SCOPE: CPT

## 2018-11-10 PROCEDURE — 80048 BASIC METABOLIC PNL TOTAL CA: CPT

## 2018-11-10 PROCEDURE — 71275 CT ANGIOGRAPHY CHEST: CPT

## 2018-11-10 PROCEDURE — 83605 ASSAY OF LACTIC ACID: CPT

## 2018-11-10 PROCEDURE — 83735 ASSAY OF MAGNESIUM: CPT

## 2018-11-10 PROCEDURE — 96365 THER/PROPH/DIAG IV INF INIT: CPT

## 2018-11-10 PROCEDURE — 36415 COLL VENOUS BLD VENIPUNCTURE: CPT

## 2018-11-10 PROCEDURE — 83880 ASSAY OF NATRIURETIC PEPTIDE: CPT

## 2018-11-10 PROCEDURE — 82550 ASSAY OF CK (CPK): CPT

## 2018-11-10 PROCEDURE — 99285 EMERGENCY DEPT VISIT HI MDM: CPT

## 2018-11-10 PROCEDURE — 80053 COMPREHEN METABOLIC PANEL: CPT

## 2018-11-10 PROCEDURE — 85652 RBC SED RATE AUTOMATED: CPT

## 2018-11-10 PROCEDURE — 71046 X-RAY EXAM CHEST 2 VIEWS: CPT

## 2018-11-10 PROCEDURE — 82553 CREATINE MB FRACTION: CPT

## 2018-11-10 PROCEDURE — 93005 ELECTROCARDIOGRAM TRACING: CPT

## 2018-11-10 PROCEDURE — 96361 HYDRATE IV INFUSION ADD-ON: CPT

## 2018-11-10 PROCEDURE — 87040 BLOOD CULTURE FOR BACTERIA: CPT

## 2018-11-10 PROCEDURE — 96367 TX/PROPH/DG ADDL SEQ IV INF: CPT

## 2018-11-10 PROCEDURE — 85025 COMPLETE CBC W/AUTO DIFF WBC: CPT

## 2018-11-10 PROCEDURE — 94760 N-INVAS EAR/PLS OXIMETRY 1: CPT

## 2018-11-10 PROCEDURE — 87086 URINE CULTURE/COLONY COUNT: CPT

## 2018-11-10 RX ADMIN — ASPIRIN 81 MG CHEWABLE TABLET SCH: 81 TABLET CHEWABLE at 23:13

## 2018-11-10 RX ADMIN — HEPARIN SODIUM SCH UNIT: 5000 INJECTION, SOLUTION INTRAVENOUS; SUBCUTANEOUS at 23:12

## 2018-11-10 RX ADMIN — METOPROLOL SUCCINATE SCH: 25 TABLET, EXTENDED RELEASE ORAL at 23:14

## 2018-11-10 RX ADMIN — PRAVASTATIN SODIUM SCH: 40 TABLET ORAL at 23:14

## 2018-11-10 RX ADMIN — TAMSULOSIN HYDROCHLORIDE SCH: 0.4 CAPSULE ORAL at 23:18

## 2018-11-10 NOTE — ED
General Adult HPI





- General


Chief complaint: Shortness of Breath


Stated complaint: Weakness


Time Seen by Provider: 11/10/18 16:28


Source: patient, RN notes reviewed, old records reviewed


Mode of arrival: ambulatory


Limitations: no limitations





- History of Present Illness


Initial comments: 





73-year-old male presents for evaluation generalized weakness, dyspnea, and 

poor intake.  Patient has history of stage IV lung cancer, he has received 

chemotherapy and is currently on Opdivo.  Patient had previous pleural effusion 

requiring Pleurx catheter, this is been removed.  Denies chest pain.  Denies 

fever but reports that he's had some chills.  No abdominal pain.  No dysuria 

however patient does report some hematuria over the past 24 hours.  Patient 

contacted the on-call oncologist who instructed them to present to the 

emergency department for evaluation.





- Related Data


 Home Medications











 Medication  Instructions  Recorded  Confirmed


 


Pravastatin Sodium [Pravachol] 40 mg PO HS 09/14/17 11/10/18


 


Timolol 0.5% Ophth Soln [Timoptic 1 drop BOTH EYES DAILY 09/14/17 11/10/18





0.5% Ophth Soln]   


 


metFORMIN HCL [Glucophage] 500 mg PO BID 09/14/17 11/10/18


 


Aspirin [Adult Low Dose Aspirin EC] 81 mg PO HS 03/02/18 11/10/18


 


Calcium Carbonate [Calcium] 600 mg PO BID 07/23/18 11/10/18


 


Cholecalciferol (Vitamin D3) 2,000 unit PO DAILY 07/23/18 11/10/18





[Vitamin D3]   


 


Cyanocobalamin (Vitamin B-12) 1,000 mcg PO DAILY 07/23/18 11/10/18





[Vitamin B-12]   


 


Acetaminophen Tab [Tylenol Tab] 1,000 mg PO Q6HR PRN 11/10/18 11/10/18


 


Bisacodyl [Dulcolax] 5 mg PO DAILY PRN 11/10/18 11/10/18


 


Metoprolol Succinate (ER) [Toprol 25 mg PO HS 11/10/18 11/10/18





Xl]   


 


Naproxen Sodium [Aleve] 220 mg PO DAILY PRN 11/10/18 11/10/18


 


Deidra Dephilus 1 tab PO DAILY 11/10/18 11/10/18


 


Tamsulosin [Flomax] 0.4 mg PO HS 11/10/18 11/10/18


 


traMADol HCL [Ultram] 50 mg PO DAILY PRN 11/10/18 11/10/18











 Allergies











Allergy/AdvReac Type Severity Reaction Status Date / Time


 


bee venom protein (honey bee) Allergy  Swelling Verified 11/10/18 16:35














Review of Systems


ROS Statement: 


Those systems with pertinent positive or pertinent negative responses have been 

documented in the HPI.





ROS Other: All systems not noted in ROS Statement are negative.





Past Medical History


Past Medical History: Atrial Fibrillation, Cancer, Diabetes Mellitus, Eye 

Disorder, Hyperlipidemia, Hypertension


Additional Past Medical History / Comment(s): CHEMO LAST DOSE 06/12/18, partial 

amputation 2nd digit rt hand 9-2017, GLAUCOMA, pleural effusion. THOROCENTESIS 2 /21 & 2/28/18, Rt Lung cancer dx Feb 2018


History of Any Multi-Drug Resistant Organisms: None Reported


Past Surgical History: Hernia Repair, Orthopedic Surgery


Additional Past Surgical History / Comment(s): 03/2018 RT PLEURX CATHETER, 

repair partial amputation 2nd digit rt hand 9-15-17, LEFT SHOULDER SX, mitral 

valve repair "Audra Ring",umbilical hernia repair, Rt thoracentesis X2


Past Anesthesia/Blood Transfusion Reactions: No Reported Reaction


Past Psychological History: No Psychological Hx Reported


Smoking Status: Former smoker


Past Alcohol Use History: None Reported


Past Drug Use History: None Reported





- Past Family History


  ** Mother


Family Medical History: No Reported History





General Exam


Limitations: no limitations


General appearance: alert, in no apparent distress


Head exam: Present: atraumatic, normocephalic


Eye exam: Present: normal appearance, PERRL


ENT exam: Present: mucous membranes dry


Neck exam: Present: normal inspection.  Absent: tenderness, meningismus


Respiratory exam: Present: rales, decreased breath sounds (Right lung base)


Cardiovascular Exam: Present: regular rate, normal rhythm


GI/Abdominal exam: Present: soft.  Absent: distended, tenderness


Extremities exam: Present: normal inspection, normal capillary refill.  Absent: 

pedal edema


Neurological exam: Present: alert, oriented X3


Psychiatric exam: Present: normal affect, normal mood


Skin exam: Present: warm, dry, intact.  Absent: cyanosis, diaphoretic





Course


 Vital Signs











  11/10/18 11/10/18 11/10/18





  16:13 17:44 18:32


 


Temperature 97.9 F  100 F H


 


Pulse Rate 69 101 H 101 H


 


Respiratory 22 20 16





Rate   


 


Blood Pressure 136/88 134/54 134/71


 


O2 Sat by Pulse 88 L 94 L 95





Oximetry   














EKG Findings





- EKG Comments:


EKG Findings:: EKG: A. fib with variable AV block, rate 91, QRS duration 80, 

QTc 413





Medical Decision Making





- Medical Decision Making





73-year-old male history of stage IV lung cancer.  Presented with generalized 

weakness, dyspnea, mild cough.  Chest x-ray obtained, shows small right-sided 

pleural effusion, and concern first base disease on the right lower lung 

fields.  Patient has no blood cell count 3.9, hemoglobin is 11.3 which 

according to family is trending up.  Sodium is 127.  Case discussed with 

oncology on-call, Dr. Penny, recommends antibiotics, hold steroids if 

possible.  Patient is not prescribed any steroids in the ER and no need for 

steroids at this time.





Case discussed with patient's primary care physician Dr. Sanchez.  Recommend CT 

angiography for pulmonary embolism.  This will be obtained in the emergency 

department and is pending.  Patient will be admitted for IV antibiotics.  Given 

ceftriaxone and azithromycin in the emergency department.  He was also given a 

dose of cefepime. 








Diagnosis: Dyspnea, pleural effusion, pneumonial, stage IV lung cancer.





- Lab Data


Result diagrams: 


 11/10/18 17:17





 11/10/18 17:17


 Lab Results











  11/10/18 11/10/18 11/10/18 Range/Units





  17:17 17:17 17:17 


 


WBC   3.9   (3.8-10.6)  k/uL


 


RBC   3.67 L   (4.30-5.90)  m/uL


 


Hgb   11.3 L   (13.0-17.5)  gm/dL


 


Hct   33.4 L   (39.0-53.0)  %


 


MCV   91.1   (80.0-100.0)  fL


 


MCH   30.8   (25.0-35.0)  pg


 


MCHC   33.9   (31.0-37.0)  g/dL


 


RDW   14.0   (11.5-15.5)  %


 


Plt Count   189   (150-450)  k/uL


 


Neutrophils %   79   %


 


Lymphocytes %   10   %


 


Monocytes %   7   %


 


Eosinophils %   1   %


 


Basophils %   0   %


 


Neutrophils #   3.1   (1.3-7.7)  k/uL


 


Lymphocytes #   0.4 L   (1.0-4.8)  k/uL


 


Monocytes #   0.3   (0-1.0)  k/uL


 


Eosinophils #   0.1   (0-0.7)  k/uL


 


Basophils #   0.0   (0-0.2)  k/uL


 


PT     (9.0-12.0)  sec


 


INR     (<1.2)  


 


APTT     (22.0-30.0)  sec


 


Sodium    127 L  (137-145)  mmol/L


 


Potassium    4.8  (3.5-5.1)  mmol/L


 


Chloride    93 L  ()  mmol/L


 


Carbon Dioxide    24  (22-30)  mmol/L


 


Anion Gap    10  mmol/L


 


BUN    16  (9-20)  mg/dL


 


Creatinine    1.05  (0.66-1.25)  mg/dL


 


Est GFR (CKD-EPI)AfAm    81  (>60 ml/min/1.73 sqM)  


 


Est GFR (CKD-EPI)NonAf    70  (>60 ml/min/1.73 sqM)  


 


Glucose    177 H  (74-99)  mg/dL


 


Plasma Lactic Acid Thiago     (0.7-2.0)  mmol/L


 


Calcium    8.5  (8.4-10.2)  mg/dL


 


Magnesium    1.8  (1.6-2.3)  mg/dL


 


Total Bilirubin    0.8  (0.2-1.3)  mg/dL


 


AST    50  (17-59)  U/L


 


ALT    42  (21-72)  U/L


 


Alkaline Phosphatase    93  ()  U/L


 


Total Creatine Kinase  90    ()  U/L


 


CK-MB (CK-2)  0.3    (0.0-2.4)  ng/mL


 


CK-MB (CK-2) Rel Index  0.3    


 


NT-Pro-B Natriuret Pep     pg/mL


 


Total Protein    5.8 L  (6.3-8.2)  g/dL


 


Albumin    3.0 L  (3.5-5.0)  g/dL


 


Urine Color     


 


Urine Appearance     (Clear)  


 


Urine pH     (5.0-8.0)  


 


Ur Specific Gravity     (1.001-1.035)  


 


Urine Protein     (Negative)  


 


Urine Glucose (UA)     (Negative)  


 


Urine Ketones     (Negative)  


 


Urine Blood     (Negative)  


 


Urine Nitrite     (Negative)  


 


Urine Bilirubin     (Negative)  


 


Urine Urobilinogen     (<2.0)  mg/dL


 


Ur Leukocyte Esterase     (Negative)  


 


Urine WBC     (0-5)  /hpf


 


Hyaline Casts     (0-2)  /lpf


 


Urine Mucus     (None)  /hpf














  11/10/18 11/10/18 11/10/18 Range/Units





  17:17 17:17 17:17 


 


WBC     (3.8-10.6)  k/uL


 


RBC     (4.30-5.90)  m/uL


 


Hgb     (13.0-17.5)  gm/dL


 


Hct     (39.0-53.0)  %


 


MCV     (80.0-100.0)  fL


 


MCH     (25.0-35.0)  pg


 


MCHC     (31.0-37.0)  g/dL


 


RDW     (11.5-15.5)  %


 


Plt Count     (150-450)  k/uL


 


Neutrophils %     %


 


Lymphocytes %     %


 


Monocytes %     %


 


Eosinophils %     %


 


Basophils %     %


 


Neutrophils #     (1.3-7.7)  k/uL


 


Lymphocytes #     (1.0-4.8)  k/uL


 


Monocytes #     (0-1.0)  k/uL


 


Eosinophils #     (0-0.7)  k/uL


 


Basophils #     (0-0.2)  k/uL


 


PT    10.9  (9.0-12.0)  sec


 


INR    1.1  (<1.2)  


 


APTT    27.3  (22.0-30.0)  sec


 


Sodium     (137-145)  mmol/L


 


Potassium     (3.5-5.1)  mmol/L


 


Chloride     ()  mmol/L


 


Carbon Dioxide     (22-30)  mmol/L


 


Anion Gap     mmol/L


 


BUN     (9-20)  mg/dL


 


Creatinine     (0.66-1.25)  mg/dL


 


Est GFR (CKD-EPI)AfAm     (>60 ml/min/1.73 sqM)  


 


Est GFR (CKD-EPI)NonAf     (>60 ml/min/1.73 sqM)  


 


Glucose     (74-99)  mg/dL


 


Plasma Lactic Acid Thiago  2.0    (0.7-2.0)  mmol/L


 


Calcium     (8.4-10.2)  mg/dL


 


Magnesium     (1.6-2.3)  mg/dL


 


Total Bilirubin     (0.2-1.3)  mg/dL


 


AST     (17-59)  U/L


 


ALT     (21-72)  U/L


 


Alkaline Phosphatase     ()  U/L


 


Total Creatine Kinase     ()  U/L


 


CK-MB (CK-2)     (0.0-2.4)  ng/mL


 


CK-MB (CK-2) Rel Index     


 


NT-Pro-B Natriuret Pep   4690   pg/mL


 


Total Protein     (6.3-8.2)  g/dL


 


Albumin     (3.5-5.0)  g/dL


 


Urine Color     


 


Urine Appearance     (Clear)  


 


Urine pH     (5.0-8.0)  


 


Ur Specific Gravity     (1.001-1.035)  


 


Urine Protein     (Negative)  


 


Urine Glucose (UA)     (Negative)  


 


Urine Ketones     (Negative)  


 


Urine Blood     (Negative)  


 


Urine Nitrite     (Negative)  


 


Urine Bilirubin     (Negative)  


 


Urine Urobilinogen     (<2.0)  mg/dL


 


Ur Leukocyte Esterase     (Negative)  


 


Urine WBC     (0-5)  /hpf


 


Hyaline Casts     (0-2)  /lpf


 


Urine Mucus     (None)  /hpf














  11/10/18 Range/Units





  17:40 


 


WBC   (3.8-10.6)  k/uL


 


RBC   (4.30-5.90)  m/uL


 


Hgb   (13.0-17.5)  gm/dL


 


Hct   (39.0-53.0)  %


 


MCV   (80.0-100.0)  fL


 


MCH   (25.0-35.0)  pg


 


MCHC   (31.0-37.0)  g/dL


 


RDW   (11.5-15.5)  %


 


Plt Count   (150-450)  k/uL


 


Neutrophils %   %


 


Lymphocytes %   %


 


Monocytes %   %


 


Eosinophils %   %


 


Basophils %   %


 


Neutrophils #   (1.3-7.7)  k/uL


 


Lymphocytes #   (1.0-4.8)  k/uL


 


Monocytes #   (0-1.0)  k/uL


 


Eosinophils #   (0-0.7)  k/uL


 


Basophils #   (0-0.2)  k/uL


 


PT   (9.0-12.0)  sec


 


INR   (<1.2)  


 


APTT   (22.0-30.0)  sec


 


Sodium   (137-145)  mmol/L


 


Potassium   (3.5-5.1)  mmol/L


 


Chloride   ()  mmol/L


 


Carbon Dioxide   (22-30)  mmol/L


 


Anion Gap   mmol/L


 


BUN   (9-20)  mg/dL


 


Creatinine   (0.66-1.25)  mg/dL


 


Est GFR (CKD-EPI)AfAm   (>60 ml/min/1.73 sqM)  


 


Est GFR (CKD-EPI)NonAf   (>60 ml/min/1.73 sqM)  


 


Glucose   (74-99)  mg/dL


 


Plasma Lactic Acid Thiago   (0.7-2.0)  mmol/L


 


Calcium   (8.4-10.2)  mg/dL


 


Magnesium   (1.6-2.3)  mg/dL


 


Total Bilirubin   (0.2-1.3)  mg/dL


 


AST   (17-59)  U/L


 


ALT   (21-72)  U/L


 


Alkaline Phosphatase   ()  U/L


 


Total Creatine Kinase   ()  U/L


 


CK-MB (CK-2)   (0.0-2.4)  ng/mL


 


CK-MB (CK-2) Rel Index   


 


NT-Pro-B Natriuret Pep   pg/mL


 


Total Protein   (6.3-8.2)  g/dL


 


Albumin   (3.5-5.0)  g/dL


 


Urine Color  Yellow  


 


Urine Appearance  Clear  (Clear)  


 


Urine pH  6.0  (5.0-8.0)  


 


Ur Specific Gravity  1.012  (1.001-1.035)  


 


Urine Protein  1+ H  (Negative)  


 


Urine Glucose (UA)  Negative  (Negative)  


 


Urine Ketones  Negative  (Negative)  


 


Urine Blood  Negative  (Negative)  


 


Urine Nitrite  Negative  (Negative)  


 


Urine Bilirubin  Negative  (Negative)  


 


Urine Urobilinogen  3.0  (<2.0)  mg/dL


 


Ur Leukocyte Esterase  Negative  (Negative)  


 


Urine WBC  1  (0-5)  /hpf


 


Hyaline Casts  1  (0-2)  /lpf


 


Urine Mucus  Rare H  (None)  /hpf














Disposition


Clinical Impression: 


 Pneumonia, Pleural effusion





Disposition: ADMITTED AS IP TO THIS HOSP


Condition: Stable


Is patient prescribed a controlled substance at d/c from ED?: No


Referrals: 


Seth Sanchez MD [Primary Care Provider] - 1-2 days


Time of Disposition: 19:24

## 2018-11-10 NOTE — XR
EXAMINATION TYPE: XR chest 2V

 

DATE OF EXAM: 11/10/2018

 

COMPARISON: Chest radiograph 3/15/2018

 

HISTORY: History of lung carcinoma

 

TECHNIQUE:  Frontal and lateral views of the chest are obtained.

 

FINDINGS:  Bibasilar airspace opacities with small right-sided pleural effusion. No pneumothorax. No 
right hilar mass is again not well appreciated. Sternotomy wires are evident. Osseous structures are 
unremarkable.

 

IMPRESSION:  

1. Interval development of right greater than left bibasilar airspace opacities. Infectious etiology 
should be considered.

2. Right-sided pleural effusion. 

3. Known right hilar mass is again not well appreciated on chest radiograph.

## 2018-11-10 NOTE — CT
EXAMINATION TYPE: CT angio chest

 

DATE OF EXAM: 11/10/2018

 

COMPARISON: PET/CT 9/8/2018

 

HISTORY: weakness, hx of lung ca, pneumonia

 

CT DLP: 289.8 mGycm. Automated Exposure Control for Dose Reduction was Utilized.

 

CONTRAST: 

CTA scan of the thorax is performed with IV Contrast, patient injected with 77cc mL of Isovue 370, pu
lmonary embolism protocol.  MIP Images are created on CT scanner and reviewed.

 

FINDINGS:

 

LUNGS: Bilateral pleural effusions are present, similar to most recent PET/CT. The known right lower 
lobe carcinoma measures similar to most recent prior study. A new nodule is identified in the right u
pper lung measuring up to 1.1 cm and is pleural-based. Additional nodule is seen pleural-based in the
 right upper lung measuring up to 1.1 cm. Groundglass opacities are seen diffusely throughout both simona
ngs most prominent in the left upper lung.

 

MEDIASTINUM: There is satisfactory enhancement of the pulmonary artery and its branches, there is no 
CT evidence for pulmonary embolism.  There are no greater than 1 cm hilar or mediastinal lymph nodes.
   No cardiomegaly or pericardial effusion is seen. 

 

OTHER: Osseous structures are unchanged. Limited evaluation of the upper abdomen is unremarkable. Tulio
rnotomy wires are present.

 

IMPRESSION: 

1. No pulmonary artery embolism.

2. Interval development of diffuse bilateral groundglass opacities. Infectious versus inflammatory et
iologies are favored. 

3. New pleural-based lung nodules in the right upper lung. Given the short interval development of th
nataliia findings, neoplasm should be considered.

4. Similar right pleural effusion with right lower lobe mass consistent with known lung carcinoma.

## 2018-11-11 LAB
ANION GAP SERPL CALC-SCNC: 10 MMOL/L
BASOPHILS # BLD AUTO: 0 K/UL (ref 0–0.2)
BASOPHILS NFR BLD AUTO: 1 %
BUN SERPL-SCNC: 15 MG/DL (ref 9–20)
CALCIUM SPEC-MCNC: 8 MG/DL (ref 8.4–10.2)
CHLORIDE SERPL-SCNC: 95 MMOL/L (ref 98–107)
CO2 SERPL-SCNC: 24 MMOL/L (ref 22–30)
EOSINOPHIL # BLD AUTO: 0 K/UL (ref 0–0.7)
EOSINOPHIL NFR BLD AUTO: 1 %
ERYTHROCYTE [DISTWIDTH] IN BLOOD BY AUTOMATED COUNT: 3.47 M/UL (ref 4.3–5.9)
ERYTHROCYTE [DISTWIDTH] IN BLOOD: 14 % (ref 11.5–15.5)
GLUCOSE BLD-MCNC: 151 MG/DL (ref 75–99)
GLUCOSE BLD-MCNC: 164 MG/DL (ref 75–99)
GLUCOSE BLD-MCNC: 210 MG/DL (ref 75–99)
GLUCOSE BLD-MCNC: 233 MG/DL (ref 75–99)
GLUCOSE SERPL-MCNC: 144 MG/DL (ref 74–99)
HCT VFR BLD AUTO: 32.3 % (ref 39–53)
HGB BLD-MCNC: 10.7 GM/DL (ref 13–17.5)
LYMPHOCYTES # SPEC AUTO: 0.4 K/UL (ref 1–4.8)
LYMPHOCYTES NFR SPEC AUTO: 13 %
MCH RBC QN AUTO: 30.7 PG (ref 25–35)
MCHC RBC AUTO-ENTMCNC: 33 G/DL (ref 31–37)
MCV RBC AUTO: 93.1 FL (ref 80–100)
MONOCYTES # BLD AUTO: 0.3 K/UL (ref 0–1)
MONOCYTES NFR BLD AUTO: 8 %
NEUTROPHILS # BLD AUTO: 2.5 K/UL (ref 1.3–7.7)
NEUTROPHILS NFR BLD AUTO: 75 %
PLATELET # BLD AUTO: 186 K/UL (ref 150–450)
POTASSIUM SERPL-SCNC: 4.4 MMOL/L (ref 3.5–5.1)
SODIUM SERPL-SCNC: 129 MMOL/L (ref 137–145)
WBC # BLD AUTO: 3.3 K/UL (ref 3.8–10.6)

## 2018-11-11 RX ADMIN — HEPARIN SODIUM SCH UNIT: 5000 INJECTION, SOLUTION INTRAVENOUS; SUBCUTANEOUS at 08:45

## 2018-11-11 RX ADMIN — PRAVASTATIN SODIUM SCH MG: 40 TABLET ORAL at 20:50

## 2018-11-11 RX ADMIN — ASPIRIN 81 MG CHEWABLE TABLET SCH MG: 81 TABLET CHEWABLE at 20:50

## 2018-11-11 RX ADMIN — METOPROLOL SUCCINATE SCH MG: 25 TABLET, EXTENDED RELEASE ORAL at 20:50

## 2018-11-11 RX ADMIN — HEPARIN SODIUM SCH UNIT: 5000 INJECTION, SOLUTION INTRAVENOUS; SUBCUTANEOUS at 17:48

## 2018-11-11 RX ADMIN — TIMOLOL MALEATE SCH DROPS: 5 SOLUTION OPHTHALMIC at 08:46

## 2018-11-11 RX ADMIN — CYANOCOBALAMIN TAB 500 MCG SCH MCG: 500 TAB at 08:46

## 2018-11-11 RX ADMIN — TAMSULOSIN HYDROCHLORIDE SCH MG: 0.4 CAPSULE ORAL at 20:51

## 2018-11-11 RX ADMIN — LACTOBACILLUS ACIDOPHILUS / LACTOBACILLUS BULGARICUS SCH EACH: 100 MILLION CFU STRENGTH GRANULES at 08:46

## 2018-11-11 NOTE — HP
HISTORY AND PHYSICAL



DATE OF SERVICE:

11/10/2018.



CHIEF COMPLAINT:

Weakness.



HISTORY OF PRESENT ILLNESS:

This 73-year-old gentleman was evaluated in the emergency room upon referral by his

oncologist for evaluation.  The patient complained of weakness and shortness of breath.

After evaluation by the ER physician was contacted and I did see the patient in the

emergency room.  He appears better with the use of oxygen.  He was noted to have a

pulse ox of 88 percent on arrival to the ER.  The patient says the past few days he has

had a cough, congestion, and weakness.  Denied any fever or chills.  Appetite remains

poor.  The patient is known to have left lung cancer for which he is on treatment with

OPDIVO q.2 weeks.  The last treatment was 2018.  The patient had received 6

courses of chemotherapy prior to that.  He was actually scheduled to go to [place] at

[facility] for an opinion.  The patient denies any chest pain. Does have shortness of

breath at rest as well.  Denies any orthopnea or PND.



PAST MEDICAL HISTORY:

Significant for atrial fibrillation, controlled.  He also had an episode of atrial

flutter in the past.  He has a history of mitral valve repair.  No history of

congestive cardiac failure.  Right lobe malignant pleural effusion.  History of

peripheral neuritis associated with diabetes mellitus, type 2 diabetes, adequately

controlled.  History of degenerative arthritis.



PAST SURGICAL HISTORY:

Cardiac surgery for mitral valve repair, left shoulder scope, right thoracentesis.



PERSONAL HISTORY:

Ex-smoker, smoked a pack a day daily for 30 years.  Alcohol: None at present.



SOCIAL HISTORY:

Patient is , lives with his spouse.  He is a retired .



FAMILY MEDICAL HISTORY:

Father  at the age of 69.  He had diabetes mellitus, coronary artery disease.

Mother  at the age of 91 following a CVA.  The brother at age 78 with history of

COPD.  A sister 71 in good health.  Daughter 47 in good health.  Son 36 in good health.



REVIEW OF SYSTEMS:

NEURO: Denies any headaches, dizziness.  No double vision, blurred vision.  No symptoms

of TIA, syncope, seizures.

PSYCH: Denies anxiety, depression, some apprehension.

CARDIAC: Denies chest pain, angina, palpitations.

RESPIRATORY: Present complaint of shortness of breath, cough.  No hemoptysis.

GI: No nausea, vomiting, abdominal pain, poor appetite.  No diarrhea, constipation.

: No symptoms of dysuria.  Had some hematuria, which he noticed a little spot of

blood when he peed. He was having difficulty urinating last week.

CONSTITUTIONAL:  Fever, chills at home, had malaise, noted to have a low-grade

temperature of 100 in the hospital.

SKIN:  No rashes.



MEDICATIONS:

At home includes tramadol 50 p.r.n. t.i.d., metformin 500 mg b.i.d., timolol eye drops,

both eyes, Flomax 0.4 mg daily, _____1 tablet daily, pravastatin 40 mg daily, Naproxen

220 mg p.r.n., metoprolol 25 mg daily, vitamin B12 1000 mcg daily, vitamin D3 2000

units daily, calcium 600 mg b.i.d., Dulcolax 5 mg p.r.n., aspirin 81 mg daily, Tylenol

1000 mg q.6 p.r.n.



SOCIAL HISTORY:

The patient is , lives with his spouse.



PHYSICAL EXAMINATION:

Pleasant gentleman at present in no distress.  Vital signs reveals temperature 100,

pulse 101, respirations 16, blood pressure 134/71, pulse ox 95 percent 2 L.

HEENT:  Normocephalic. Neck, no JVD.  Pupils are reactive.  Pharynx clear.  Oral cavity

is moist.  No supraclavicular lymphadenopathy.  No carotid bruits. CHEST EXAMINATION:

Minimal dullness to percussion right base with mild decreased air flow right base

otherwise lung fields clear. CARDIAC: Distant heart sounds S1, S2 with no gallops.

Systolic murmur 2/6 left sternal border.  Abdomen is soft.  Bowel sounds normal.  No

organomegaly.  No abdominal bruits.  Extremities reveal no edema.  No tenderness.

Neurologically awake, alert, oriented x3 with well-coordinated movements.



LABORATORY ASSESSMENT:

With hemoglobin 11.3, white count 2900, platelets of 189, lymphocytes are low.  PT/PTT

normal.  Sodium 127, creatinine 4.8, chloride 93, BUN 16, creatinine 1.05, glucose was

177.  Normal kidney and hepatic function.  BNP was 4690.  Albumin 3.0.  Chest x-ray,

right pleural effusion.  CT scan of the chest reveals ground-glass opacities at

bilateral bases.



ASSESSMENT:

1. Possible pneumonia.

2. History of cancer of the lung.

3. Mild hyponatremia.

4. Diabetes mellitus type 2, controlled.

5. Mild anemia secondary to chronic disease and recent chemotherapy.

6. Elevated BNP with no clinical evidence of congestive heart failure.

7. History of mitral valve repair.

8. History of atrial fibrillation-flutter 1 episode in the past.  At present, sinus

    rhythm with PACs.



PLAN:

Continue present medical regimen.  Patient has been started on IV antibiotics.

Prognosis remains guarded.  Consult with Oncology.  The patient's condition discussed

with the patient. Prognosis guarded.





MMODL / IJN: [id number] / Job#: [id number]

## 2018-11-11 NOTE — P.CONS
History of Present Illness





- Reason for Consult


Consult date: 11/11/18


Lung cancer on Opdivo


Requesting physician: Seth Sanchez





- Chief Complaint


Weakness and lethargy, hematuria





- History of Present Illness





Mr. Maloney is an 73-year-old gentleman patient of Dr. Sanchez who initially 

presented with increasing shortness of breath for 2 months with orthopnea.  

Chest x-ray showed a right pleural effusion and CT showed a right lower lobe 

mass measuring 4.6 cm with right hilar lymphadenopathy.  He underwent 

thoracentesis on 2/21/18, cytology positive for metastatic adenocarcinoma. MRI 

of the brain was negative.  His PD L1 with 0%. Pleurx catheter was placed and 

he was started on therapy with carbo, Alimta, Avastin, and Xgeva per [facility] 

recommendations.   He tolerated the first 4 cycles of chemo relatively well and 

was continued for a total of 6 cycles.  His drainage from the Pleurx slowly 

improved and this was eventually removed.  He was doing well with improvement 

in his disease based on scan.  Through 9/2018 when he began complaining of 

fatigue and PET scan revealed possible new bone lesions concerning for 

metastatic disease.  He was then started on immunotherapy with opdivo for 

second line treatment.  He received his first cycle on 10/7/18 at 480 mg every 

4 weeks however he presented shortly after on 10/15/18 with multiple complaints 

including headache, fatigue, nausea, diarrhea, and dizziness.  He was started 

on a high-dose prednisone of 80 mg daily and followed up with Dr. Beauchamp on 10/

23/18.  He was quickly weaned down on his steroids at that point by 10 mg per 

day and the plan was to resume Opdivo as scheduled at 240 mg every 14 days 

instead of the every 28 days dosing.  He did receive his cycle 2 of up Opdivo 

on 11/1/18.  he received his cycle and again began having fatigue.  He started 

also having minimal hematuria and called his PCP with the plan of an outpatient 

urology referral.  He subsequently contacted me yesterday with multiple 

complaints including increasing shortness of breath and fatigue.  Advised him 

to go to the ER for further evaluation due to the severity of the described 

symptoms.  In the ER his workup showed normal/stable CBC, hyponatremia with Na 

127, improved to 129 with hydration, creatinine of 1 at baseline, and a BNP of 

4600.  Chest x-ray and CT of the chest showed diffuse bilateral opacities, 

infectious versus inflammatory as well as his known right lower lobe mass and a 

right pleural effusion.  He was started on antibiotics for possible pneumonia 

and was admitted.  He was placed on 2 L of nasal cannula with a saturation of 93

% however the rest of his vitals were stable except for a temperature of up to 

100.  Currently he feels much better.  He is off oxygen saturating 90%.  

Continues to have slight fatigue however again overall doing much better.  No 

other new complaints.  He does mention he was scheduled to fly down to [place] 

for a second opinion at [facility] which she plans to reschedule. 





He smoked 1 PPD X 25 years, quit smoking 30 years ago, denies ETOH overuse, he 

is a retired . Rito denied family history of malignancy, 

his grand son had Neuroblastoma at age 3 and treated at [facility] [place] in [facility].








Review of Systems


All systems: negative


Constitutional: Reports as per HPI





Past Medical History


Past Medical History: Atrial Fibrillation, Cancer, Diabetes Mellitus, Eye 

Disorder, Hyperlipidemia, Hypertension


Additional Past Medical History / Comment(s): CHEMO LAST DOSE 06/12/18, partial 

amputation 2nd digit rt hand 9-2017, GLAUCOMA, pleural effusion. THOROCENTESIS 2 /21 & 2/28/18, Rt Lung cancer dx Feb 2018


History of Any Multi-Drug Resistant Organisms: None Reported


Past Surgical History: Hernia Repair, Orthopedic Surgery


Additional Past Surgical History / Comment(s): 03/2018 RT PLEURX CATHETER, 

repair partial amputation 2nd digit rt hand 9-15-17, LEFT SHOULDER SX, mitral 

valve repair "Audra Ring",umbilical hernia repair, Rt thoracentesis X2


Past Anesthesia/Blood Transfusion Reactions: No Reported Reaction


Past Psychological History: No Psychological Hx Reported


Smoking Status: Former smoker


Past Alcohol Use History: None Reported


Additional Past Alcohol Use History / Comment(s): quit smoking 1980's, smoked 

approx 25yrs,


Past Drug Use History: None Reported





- Past Family History


  ** Mother


Family Medical History: No Reported History





Medications and Allergies


 Home Medications











 Medication  Instructions  Recorded  Confirmed  Type


 


Pravastatin Sodium [Pravachol] 40 mg PO HS 09/14/17 11/10/18 History


 


Timolol 0.5% Ophth Soln [Timoptic 1 drop BOTH EYES DAILY 09/14/17 11/10/18 

History





0.5% Ophth Soln]    


 


metFORMIN HCL [Glucophage] 500 mg PO BID 09/14/17 11/10/18 History


 


Aspirin [Adult Low Dose Aspirin EC] 81 mg PO HS 03/02/18 11/10/18 History


 


Calcium Carbonate [Calcium] 600 mg PO BID 07/23/18 11/10/18 History


 


Cholecalciferol (Vitamin D3) 2,000 unit PO DAILY 07/23/18 11/10/18 History





[Vitamin D3]    


 


Cyanocobalamin (Vitamin B-12) 1,000 mcg PO DAILY 07/23/18 11/10/18 History





[Vitamin B-12]    


 


Acetaminophen Tab [Tylenol Tab] 1,000 mg PO Q6HR PRN 11/10/18 11/10/18 History


 


Bisacodyl [Dulcolax] 5 mg PO DAILY PRN 11/10/18 11/10/18 History


 


Metoprolol Succinate (ER) [Toprol 25 mg PO HS 11/10/18 11/10/18 History





Xl]    


 


Naproxen Sodium [Aleve] 220 mg PO DAILY PRN 11/10/18 11/10/18 History


 


Deidra Dephilus 1 tab PO DAILY 11/10/18 11/10/18 History


 


Tamsulosin [Flomax] 0.4 mg PO HS 11/10/18 11/10/18 History


 


traMADol HCL [Ultram] 50 mg PO DAILY PRN 11/10/18 11/10/18 History











 Allergies











Allergy/AdvReac Type Severity Reaction Status Date / Time


 


bee venom protein (honey bee) Allergy  Swelling Verified 11/10/18 16:35














Physical Exam


Vitals: 


 Vital Signs











  Temp Pulse Pulse Resp BP BP Pulse Ox


 


 11/11/18 05:00  98.4 F   81  16   119/62  90 L


 


 11/10/18 23:00  98.8 F   76  16   123/67  93 L


 


 11/10/18 22:22  99 F  98   18  121/79   95


 


 11/10/18 20:36   79   18  129/79   95


 


 11/10/18 18:32  100 F H  101 H   16  134/71   95


 


 11/10/18 17:44   101 H   20  134/54   94 L


 


 11/10/18 16:13  97.9 F  69   22  136/88   88 L








 Intake and Output











 11/10/18 11/11/18 11/11/18





 22:59 06:59 14:59


 


Intake Total 590  


 


Balance 590  


 


Intake:   


 


  Oral 590  


 


Other:   


 


  Voiding Method  Toilet 


 


  # Voids 1 1 


 


  Weight 73.482 kg  














General: In no acute distress.


HEENT: No conjunctival pallor or scleral icterus.  Mucosa moist.


Neck: Neck supple.


Lymph: No cervical/supraclavicular LAD.


Lungs: CTA-B without wheezing or rhonchi.


Heart: RRR.  No LE edema.


Abdomen: Soft, nontender, nondistended, with positive bowel sounds.  


MSK: 4/4 strength in all 4 extremities.


Neuro: Alert and oriented 3.  No obvious gross neurologic deficits.  


Skin: No jaundice or rash.


Psych: Appropriate affect.








Results


CBC & Chem 7: 


 11/11/18 06:54





 11/11/18 06:54


Labs: 


 Abnormal Lab Results - Last 24 Hours (Table)











  11/10/18 11/10/18 11/10/18 Range/Units





  17:17 17:17 17:40 


 


WBC     (3.8-10.6)  k/uL


 


RBC  3.67 L    (4.30-5.90)  m/uL


 


Hgb  11.3 L    (13.0-17.5)  gm/dL


 


Hct  33.4 L    (39.0-53.0)  %


 


Lymphocytes #  0.4 L    (1.0-4.8)  k/uL


 


Sodium   127 L   (137-145)  mmol/L


 


Chloride   93 L   ()  mmol/L


 


Glucose   177 H   (74-99)  mg/dL


 


POC Glucose (mg/dL)     (75-99)  mg/dL


 


Calcium     (8.4-10.2)  mg/dL


 


Total Protein   5.8 L   (6.3-8.2)  g/dL


 


Albumin   3.0 L   (3.5-5.0)  g/dL


 


Urine Protein    1+ H  (Negative)  


 


Urine Mucus    Rare H  (None)  /hpf














  11/11/18 11/11/18 11/11/18 Range/Units





  06:54 06:54 07:07 


 


WBC  3.3 L    (3.8-10.6)  k/uL


 


RBC  3.47 L    (4.30-5.90)  m/uL


 


Hgb  10.7 L    (13.0-17.5)  gm/dL


 


Hct  32.3 L    (39.0-53.0)  %


 


Lymphocytes #  0.4 L    (1.0-4.8)  k/uL


 


Sodium   129 L   (137-145)  mmol/L


 


Chloride   95 L   ()  mmol/L


 


Glucose   144 H   (74-99)  mg/dL


 


POC Glucose (mg/dL)    151 H  (75-99)  mg/dL


 


Calcium   8.0 L   (8.4-10.2)  mg/dL


 


Total Protein     (6.3-8.2)  g/dL


 


Albumin     (3.5-5.0)  g/dL


 


Urine Protein     (Negative)  


 


Urine Mucus     (None)  /hpf








 Microbiology - Last 24 Hours (Table)











 11/10/18 17:40 Urine Culture - Preliminary





 Urine,Voided 








BNP 4690





Comments: 





PET from 9/2018 reviewed.





Chest x-ray: report reviewed, image reviewed


CT scan - chest: report reviewed, image reviewed





Assessment and Plan


Assessment: 





1. SOB and weakness


2. Diffuse lung infiltrates, pneumonia vs inflammatory


3. Heart failure with BNP 4690


4. Lung cancer on immunotherapy with Opdivo


5. Hyponatremia


6. Anemia, likely due to chronic disease (cancer)





Plan: 





Mr. Maloney is a very pleasant 73-year-old gentleman with history of 

metastatic lung cancer on second line treatment with Opdivo, status post cycle 

2 at the acute 2 week dosing, who is coming in for increased fatigue and 

shortness of breath.  Workup shows some hyponatremia as well as a significantly 

elevated BNP.  Chest x-ray and CT with bilateral diffuse groundglass opacities, 

mostly on the left after review of image.  He is feeling much better after a 

day of antibiotics and hydration.  His hyponatremia is improving.  I did 

discuss this case with his PCP Dr. Sanchez.  Agree with treatment of a possible 

pneumonia.  Immunotherapy can cause pneumonitis however this is uncommon and 

would unlikely resolved so quickly.  Doubt his CT changes are due to 

immunotherapy toxicity.  His BNP is severely elevated however and would 

question fluid overload although clinically he does not appear to have any 

signs of fluid overload.  If patient continues to improve we'll treat him for 

possible pneumonia and have him follow-up in clinic and continue his 

immunotherapy as scheduled.  He also does have mild anemia which is at baseline

, likely due to chronic disease and malignancy.  I discussed this with the 

patient in detail and she is agreeable to the plan.  All of his questions were 

answered.

## 2018-11-12 LAB
GLUCOSE BLD-MCNC: 159 MG/DL (ref 75–99)
GLUCOSE BLD-MCNC: 164 MG/DL (ref 75–99)
GLUCOSE BLD-MCNC: 218 MG/DL (ref 75–99)
GLUCOSE BLD-MCNC: 231 MG/DL (ref 75–99)

## 2018-11-12 RX ADMIN — METOPROLOL SUCCINATE SCH MG: 25 TABLET, EXTENDED RELEASE ORAL at 20:21

## 2018-11-12 RX ADMIN — APIXABAN SCH MG: 5 TABLET, FILM COATED ORAL at 20:27

## 2018-11-12 RX ADMIN — TAMSULOSIN HYDROCHLORIDE SCH MG: 0.4 CAPSULE ORAL at 20:21

## 2018-11-12 RX ADMIN — HEPARIN SODIUM SCH UNIT: 5000 INJECTION, SOLUTION INTRAVENOUS; SUBCUTANEOUS at 00:38

## 2018-11-12 RX ADMIN — ACETAMINOPHEN PRN MG: 500 TABLET ORAL at 20:21

## 2018-11-12 RX ADMIN — HEPARIN SODIUM SCH UNIT: 5000 INJECTION, SOLUTION INTRAVENOUS; SUBCUTANEOUS at 09:25

## 2018-11-12 RX ADMIN — TIMOLOL MALEATE SCH DROPS: 5 SOLUTION OPHTHALMIC at 09:25

## 2018-11-12 RX ADMIN — LACTOBACILLUS ACIDOPHILUS / LACTOBACILLUS BULGARICUS SCH EACH: 100 MILLION CFU STRENGTH GRANULES at 09:24

## 2018-11-12 RX ADMIN — HEPARIN SODIUM SCH UNIT: 5000 INJECTION, SOLUTION INTRAVENOUS; SUBCUTANEOUS at 18:05

## 2018-11-12 RX ADMIN — PIPERACILLIN AND TAZOBACTAM SCH MLS/HR: 3; .375 INJECTION, POWDER, FOR SOLUTION INTRAVENOUS at 20:17

## 2018-11-12 RX ADMIN — AZITHROMYCIN SCH MG: 500 TABLET, FILM COATED ORAL at 20:21

## 2018-11-12 RX ADMIN — CYANOCOBALAMIN TAB 500 MCG SCH MCG: 500 TAB at 09:25

## 2018-11-12 RX ADMIN — PRAVASTATIN SODIUM SCH MG: 40 TABLET ORAL at 20:22

## 2018-11-12 NOTE — XR
EXAMINATION TYPE: XR chest 2V

 

DATE OF EXAM: 11/12/2018

 

COMPARISON: Prior chest x-ray and chest CT 11/10/2018

 

HISTORY: Shortness of breath, lung cancer

 

TECHNIQUE:  Frontal and lateral views of the chest are obtained.

 

FINDINGS:  Patient is post median sternotomy. Cardiomediastinal silhouette, pulmonary vascularity and
 aydin are stable. Interstitium has become more prominent in the interval. No evident pneumothorax. Pe
rsistent blunting the right costophrenic angle is noted. Aorta is dense. Bone mineralization is stabl
e. Right lower lobe lung mass not well seen. Nodular densities seen on CT of 11/10/2018 not well appr
eciated on plain film.

 

IMPRESSION:  Correlate to exclude pulmonary venous hypertension and interstitial edema.

## 2018-11-12 NOTE — ECHOF
Referral Reason:sob



MEASUREMENTS

--------

HEIGHT: 165.1 cm

WEIGHT: 73.5 kg

BP: 132/83

RVIDd:   2.9 cm     (< 3.3)

IVSd:   1.0 cm     (0.6 - 1.1)

LVIDd:   4.8 cm     (3.9 - 5.3)

LVPWd:   1.3 cm     (0.6 - 1.1)

IVSs:   1.6 cm

LVIDs:   3.3 cm

LVPWs:   1.7 cm

LA Diam:   3.3 cm     (2.7 - 3.8)

LAESV Index (A-L):   39.29 ml/m

Ao Diam:   3.8 cm     (2.0 - 3.7)

AV Cusp:   2.3 cm     (1.5 - 2.6)

MV EXCURSION:   13.666 mm     (> 18.000)

MV EF SLOPE:   62 mm/s     (70 - 150)

EPSS:   0.5 cm

AR PHT:   645 ms

RAP:   5.00 mmHg

RVSP:   46.24 mmHg







FINDINGS

--------

Atrial fibrillation.

This was a technically good study.

The left ventricular size is normal.   There is mild concentric left ventricular hypertrophy.   Overa
ll left ventricular systolic function is normal with, an EF between 60 - 65 %.

The right ventricle is normal in size.

LA is moderately dilated 34-39 ml/m2

The right atrium is normal in size.

There is mild aortic valve sclerosis.

The mitral valve leaflets are mildly thickened.   Mild mitral annular calcification present.   Mild m
itral regurgitation is present.    The peak and mean MV gradients are 18.97mmHg 7.17mmHg as measured 
by doppler.   MV Repair.

Mild tricuspid regurgitation present.   There is moderate pulmonary hypertension.   The right ventric
ular systolic pressure, as measured by Doppler, is 46.24mmHg.

Trace/mild (physiologic)  pulmonic regurgitation.

The aortic root is dilated measuring 3.8cm.

Normal inferior vena cava with normal inspiratory collapse consistent with estimated right atrial pre
ssure of  5 mmHg.

There is no pericardial effusion.



CONCLUSIONS

--------

1. Atrial fibrillation.

2. This was a technically good study.

3. The left ventricular size is normal.

4. There is mild concentric left ventricular hypertrophy.

5. Overall left ventricular systolic function is normal with, an EF between 60 - 65 %.

6. The right ventricle is normal in size.

7. LA is moderately dilated 34-39 ml/m2

8. The right atrium is normal in size.

9. There is mild aortic valve sclerosis.

10. The mitral valve leaflets are mildly thickened.

11. Mild mitral annular calcification present.

12. Mild mitral regurgitation is present.

13. The peak and mean MV gradients are 18.97mmHg 7.17mmHg as measured by doppler.

14. MV Repair.

15. Mild tricuspid regurgitation present.

16. There is moderate pulmonary hypertension.

17. The right ventricular systolic pressure, as measured by Doppler, is 46.24mmHg.

18. Trace/mild (physiologic)  pulmonic regurgitation.

19. The aortic root is dilated measuring 3.8cm.

20. Normal inferior vena cava with normal inspiratory collapse consistent with estimated right atrial
 pressure of  5 mmHg.

21. There is no pericardial effusion.





SONOGRAPHER: Annemarie Finney RDCS

## 2018-11-13 LAB
ANION GAP SERPL CALC-SCNC: 11 MMOL/L
BASOPHILS # BLD AUTO: 0 K/UL (ref 0–0.2)
BASOPHILS NFR BLD AUTO: 0 %
BUN SERPL-SCNC: 15 MG/DL (ref 9–20)
CALCIUM SPEC-MCNC: 8.2 MG/DL (ref 8.4–10.2)
CHLORIDE SERPL-SCNC: 93 MMOL/L (ref 98–107)
CO2 SERPL-SCNC: 25 MMOL/L (ref 22–30)
EOSINOPHIL # BLD AUTO: 0 K/UL (ref 0–0.7)
EOSINOPHIL NFR BLD AUTO: 1 %
ERYTHROCYTE [DISTWIDTH] IN BLOOD BY AUTOMATED COUNT: 3.58 M/UL (ref 4.3–5.9)
ERYTHROCYTE [DISTWIDTH] IN BLOOD: 14.2 % (ref 11.5–15.5)
GLUCOSE BLD-MCNC: 171 MG/DL (ref 75–99)
GLUCOSE BLD-MCNC: 180 MG/DL (ref 75–99)
GLUCOSE BLD-MCNC: 198 MG/DL (ref 75–99)
GLUCOSE BLD-MCNC: 237 MG/DL (ref 75–99)
GLUCOSE SERPL-MCNC: 160 MG/DL (ref 74–99)
HCT VFR BLD AUTO: 33.1 % (ref 39–53)
HGB BLD-MCNC: 10.5 GM/DL (ref 13–17.5)
LYMPHOCYTES # SPEC AUTO: 0.3 K/UL (ref 1–4.8)
LYMPHOCYTES NFR SPEC AUTO: 9 %
MCH RBC QN AUTO: 29.5 PG (ref 25–35)
MCHC RBC AUTO-ENTMCNC: 31.8 G/DL (ref 31–37)
MCV RBC AUTO: 92.6 FL (ref 80–100)
MONOCYTES # BLD AUTO: 0.3 K/UL (ref 0–1)
MONOCYTES NFR BLD AUTO: 8 %
NEUTROPHILS # BLD AUTO: 2.8 K/UL (ref 1.3–7.7)
NEUTROPHILS NFR BLD AUTO: 81 %
PLATELET # BLD AUTO: 225 K/UL (ref 150–450)
POTASSIUM SERPL-SCNC: 4.4 MMOL/L (ref 3.5–5.1)
SODIUM SERPL-SCNC: 129 MMOL/L (ref 137–145)
WBC # BLD AUTO: 3.4 K/UL (ref 3.8–10.6)

## 2018-11-13 RX ADMIN — FAMOTIDINE SCH MG: 20 TABLET, FILM COATED ORAL at 21:31

## 2018-11-13 RX ADMIN — LACTOBACILLUS ACIDOPHILUS / LACTOBACILLUS BULGARICUS SCH EACH: 100 MILLION CFU STRENGTH GRANULES at 08:30

## 2018-11-13 RX ADMIN — APIXABAN SCH MG: 5 TABLET, FILM COATED ORAL at 21:26

## 2018-11-13 RX ADMIN — INSULIN ASPART SCH UNIT: 100 INJECTION, SOLUTION INTRAVENOUS; SUBCUTANEOUS at 21:27

## 2018-11-13 RX ADMIN — CYANOCOBALAMIN TAB 500 MCG SCH MCG: 500 TAB at 08:30

## 2018-11-13 RX ADMIN — PIPERACILLIN AND TAZOBACTAM SCH MLS/HR: 3; .375 INJECTION, POWDER, FOR SOLUTION INTRAVENOUS at 17:25

## 2018-11-13 RX ADMIN — AZITHROMYCIN SCH MG: 500 TABLET, FILM COATED ORAL at 21:27

## 2018-11-13 RX ADMIN — PIPERACILLIN AND TAZOBACTAM SCH MLS/HR: 3; .375 INJECTION, POWDER, FOR SOLUTION INTRAVENOUS at 02:02

## 2018-11-13 RX ADMIN — TIMOLOL MALEATE SCH DROPS: 5 SOLUTION OPHTHALMIC at 10:13

## 2018-11-13 RX ADMIN — TAMSULOSIN HYDROCHLORIDE SCH MG: 0.4 CAPSULE ORAL at 21:26

## 2018-11-13 RX ADMIN — METOPROLOL SUCCINATE SCH MG: 25 TABLET, EXTENDED RELEASE ORAL at 21:26

## 2018-11-13 RX ADMIN — PIPERACILLIN AND TAZOBACTAM SCH MLS/HR: 3; .375 INJECTION, POWDER, FOR SOLUTION INTRAVENOUS at 08:30

## 2018-11-13 RX ADMIN — METHYLPREDNISOLONE SODIUM SUCCINATE SCH MG: 125 INJECTION, POWDER, FOR SOLUTION INTRAMUSCULAR; INTRAVENOUS at 19:43

## 2018-11-13 RX ADMIN — APIXABAN SCH MG: 5 TABLET, FILM COATED ORAL at 08:30

## 2018-11-13 RX ADMIN — PRAVASTATIN SODIUM SCH MG: 40 TABLET ORAL at 21:26

## 2018-11-13 RX ADMIN — CYANOCOBALAMIN TAB 500 MCG SCH: 500 TAB at 10:56

## 2018-11-13 NOTE — XR
EXAMINATION TYPE: XR chest 2V

 

DATE OF EXAM: 11/13/2018

 

COMPARISON: 11/12/2018

 

TECHNIQUE: PA and lateral views submitted.

 

HISTORY: Shortness of breath

 

FINDINGS:

Bilateral consolidation and pleural effusion with perihilar interstitial pattern essentially unchange
d. Heart size is stable. Postsurgical changes with atherosclerotic change aorta. No sizable pneumotho
rax. Underlying COPD suspected. Hypertrophic and degenerative change of the vertebral column. Atheros
clerotic change aorta.

 

IMPRESSION:

1. Bilateral infiltrate and pleural effusion greater on the right with perihilar interstitial pattern
. Findings are stable correlate for CHF versus atypical pneumonia.

## 2018-11-13 NOTE — P.PN
Subjective


Progress Note Date: 11/13/18





 The patient is getting short of breath on exertion fairly easily.  He was 

hypoxic on room air.  He appears to be fairly comfortable at rest and is able 

to lay flat without a problem.  He denies any significant cough.  No fever/

chills/nausea/vomiting.





Objective





- Vital Signs


Vital signs: 


 Vital Signs











Temp  97.4 F L  11/13/18 12:00


 


Pulse  68   11/13/18 12:00


 


Resp  16   11/13/18 12:00


 


BP  113/65   11/13/18 12:00


 


Pulse Ox  92 L  11/13/18 12:00








 Intake & Output











 11/12/18 11/13/18 11/13/18





 18:59 06:59 18:59


 


Intake Total  1940 100


 


Output Total  800 


 


Balance  1140 100


 


Weight 73.482 kg 73.482 kg 


 


Intake:   


 


  Intake, IV Titration  300 100





  Amount   


 


    Piperacillin-Tazobactam 3  300 100





    .375 gm In Sodium   





    Chloride 0.9% 100 ml @ 25   





    mls/hr IVPB Q8HR Count includes the [facility] Rx#   





    :[id number]   


 


  Oral  1640 


 


Output:   


 


  Urine  800 


 


Other:   


 


  Voiding Method Toilet Toilet Toilet





 Urinal Urinal Urinal


 


  # Voids 2 1 














- Constitutional


General appearance: Present: no acute distress





- EENT


Eyes: Present: EOMI


ENT: Present: hearing grossly normal, normal oropharynx





- Respiratory


Respiratory: bilateral: rales





- Cardiovascular


Rhythm: regular


Heart sounds: normal: S1, S2





- Gastrointestinal


General gastrointestinal: Present: normal bowel sounds, soft





- Integumentary


Integumentary: Present: normal





- Neurologic


Neurologic: Present: CNII-XII intact





- Musculoskeletal


Musculoskeletal: Present: generalized weakness, strength equal bilaterally





- Psychiatric


Psychiatric: Present: A&O x's 3, appropriate affect





- Labs


CBC & Chem 7: 


 11/13/18 07:54





 11/13/18 07:54


Labs: 


 Abnormal Lab Results - Last 24 Hours (Table)











  11/12/18 11/13/18 11/13/18 Range/Units





  22:43 07:33 07:54 


 


WBC    3.4 L  (3.8-10.6)  k/uL


 


RBC    3.58 L  (4.30-5.90)  m/uL


 


Hgb    10.5 L  (13.0-17.5)  gm/dL


 


Hct    33.1 L  (39.0-53.0)  %


 


Lymphocytes #    0.3 L  (1.0-4.8)  k/uL


 


ESR    93 H  (0-15)  mm/hr


 


Sodium     (137-145)  mmol/L


 


Chloride     ()  mmol/L


 


Glucose     (74-99)  mg/dL


 


POC Glucose (mg/dL)  164 H  180 H   (75-99)  mg/dL


 


Calcium     (8.4-10.2)  mg/dL














  11/13/18 11/13/18 11/13/18 Range/Units





  07:54 11:15 17:02 


 


WBC     (3.8-10.6)  k/uL


 


RBC     (4.30-5.90)  m/uL


 


Hgb     (13.0-17.5)  gm/dL


 


Hct     (39.0-53.0)  %


 


Lymphocytes #     (1.0-4.8)  k/uL


 


ESR     (0-15)  mm/hr


 


Sodium  129 L    (137-145)  mmol/L


 


Chloride  93 L    ()  mmol/L


 


Glucose  160 H    (74-99)  mg/dL


 


POC Glucose (mg/dL)   237 H  198 H  (75-99)  mg/dL


 


Calcium  8.2 L    (8.4-10.2)  mg/dL








 Microbiology - Last 24 Hours (Table)











 11/10/18 17:17 Blood Culture - Preliminary





 Blood    No Growth after 48 hours














Assessment and Plan


(1) Acute respiratory failure


Narrative/Plan: 


  On initial presentation, it was felt that the patient may have a pneumonia, 

and he was started on antibiotics for the same.  He did have transient 

improvement, followed by recurrent hypoxia and some actual worsening of the 

chest x-ray.  He therefore received diuresis yesterday for possible CHF.  The 

chest x-ray looks mainly stable with persistent bilateral infiltrates.  

Clinically the patient does not appear infected or in CHF, with no cough or 

fevers, and no orthopnea.  His echocardiogram came back normal.


  Therefore at this time, with other causes appearing less likely, the 

possibility of his immunotherapy causing pneumonitis becomes much more likely.  

The case was discussed in detail with the admitting service, and also Dr. Beauchamp, his primary oncologist.  Steroids will be started for this likely 

differential.


Current Visit: Yes   Status: Acute   Code(s): J96.00 - ACUTE RESPIRATORY FAILURE

, UNSP W HYPOXIA OR HYPERCAPNIA   SNOMED Code(s): 31918625


   





(2) Lung cancer


Narrative/Plan: 


  The patient is being treated with immunotherapy, with opdivo for the same.  

If he has a good response to steroids, it would indicate that his presentation 

is likely due to treatment induced pneumonitis.  He had a similar presentation 

previously.  Therefore this may necessitate change in therapy.  Follow-up with 

oncology as outpatient to discuss the same


Current Visit: Yes   Status: Acute   Code(s): C34.90 - MALIGNANT NEOPLASM OF 

UNSP PART OF UNSP BRONCHUS OR LUNG   SNOMED Code(s): 074641157

## 2018-11-14 LAB
GLUCOSE BLD-MCNC: 216 MG/DL (ref 75–99)
GLUCOSE BLD-MCNC: 288 MG/DL (ref 75–99)
GLUCOSE BLD-MCNC: 291 MG/DL (ref 75–99)
GLUCOSE BLD-MCNC: 409 MG/DL (ref 75–99)

## 2018-11-14 RX ADMIN — INSULIN ASPART SCH UNIT: 100 INJECTION, SOLUTION INTRAVENOUS; SUBCUTANEOUS at 21:36

## 2018-11-14 RX ADMIN — PIPERACILLIN AND TAZOBACTAM SCH MLS/HR: 3; .375 INJECTION, POWDER, FOR SOLUTION INTRAVENOUS at 17:44

## 2018-11-14 RX ADMIN — METHYLPREDNISOLONE SODIUM SUCCINATE SCH MG: 125 INJECTION, POWDER, FOR SOLUTION INTRAMUSCULAR; INTRAVENOUS at 00:43

## 2018-11-14 RX ADMIN — TAMSULOSIN HYDROCHLORIDE SCH MG: 0.4 CAPSULE ORAL at 21:35

## 2018-11-14 RX ADMIN — METHYLPREDNISOLONE SODIUM SUCCINATE SCH MG: 125 INJECTION, POWDER, FOR SOLUTION INTRAMUSCULAR; INTRAVENOUS at 21:38

## 2018-11-14 RX ADMIN — APIXABAN SCH MG: 5 TABLET, FILM COATED ORAL at 21:37

## 2018-11-14 RX ADMIN — LACTOBACILLUS ACIDOPHILUS / LACTOBACILLUS BULGARICUS SCH EACH: 100 MILLION CFU STRENGTH GRANULES at 07:44

## 2018-11-14 RX ADMIN — INSULIN ASPART SCH UNIT: 100 INJECTION, SOLUTION INTRAVENOUS; SUBCUTANEOUS at 12:49

## 2018-11-14 RX ADMIN — METHYLPREDNISOLONE SODIUM SUCCINATE SCH MG: 125 INJECTION, POWDER, FOR SOLUTION INTRAMUSCULAR; INTRAVENOUS at 17:45

## 2018-11-14 RX ADMIN — INSULIN ASPART SCH UNIT: 100 INJECTION, SOLUTION INTRAVENOUS; SUBCUTANEOUS at 07:44

## 2018-11-14 RX ADMIN — ACETAMINOPHEN PRN MG: 500 TABLET ORAL at 03:26

## 2018-11-14 RX ADMIN — TIMOLOL MALEATE SCH DROPS: 5 SOLUTION OPHTHALMIC at 07:45

## 2018-11-14 RX ADMIN — METOPROLOL SUCCINATE SCH MG: 25 TABLET, EXTENDED RELEASE ORAL at 21:35

## 2018-11-14 RX ADMIN — PIPERACILLIN AND TAZOBACTAM SCH MLS/HR: 3; .375 INJECTION, POWDER, FOR SOLUTION INTRAVENOUS at 07:44

## 2018-11-14 RX ADMIN — FAMOTIDINE SCH MG: 20 TABLET, FILM COATED ORAL at 07:44

## 2018-11-14 RX ADMIN — INSULIN DETEMIR SCH UNIT: 100 INJECTION, SOLUTION SUBCUTANEOUS at 21:37

## 2018-11-14 RX ADMIN — PRAVASTATIN SODIUM SCH MG: 40 TABLET ORAL at 21:37

## 2018-11-14 RX ADMIN — AZITHROMYCIN SCH MG: 500 TABLET, FILM COATED ORAL at 21:35

## 2018-11-14 RX ADMIN — PIPERACILLIN AND TAZOBACTAM SCH MLS/HR: 3; .375 INJECTION, POWDER, FOR SOLUTION INTRAVENOUS at 00:41

## 2018-11-14 RX ADMIN — FAMOTIDINE SCH MG: 20 TABLET, FILM COATED ORAL at 21:39

## 2018-11-14 RX ADMIN — METHYLPREDNISOLONE SODIUM SUCCINATE SCH MG: 125 INJECTION, POWDER, FOR SOLUTION INTRAMUSCULAR; INTRAVENOUS at 07:45

## 2018-11-14 RX ADMIN — INSULIN ASPART SCH UNIT: 100 INJECTION, SOLUTION INTRAVENOUS; SUBCUTANEOUS at 17:44

## 2018-11-14 RX ADMIN — APIXABAN SCH MG: 5 TABLET, FILM COATED ORAL at 07:44

## 2018-11-14 RX ADMIN — CYANOCOBALAMIN TAB 500 MCG SCH MCG: 500 TAB at 07:44

## 2018-11-14 NOTE — P.PN
Subjective


Progress Note Date: 11/14/18


Principal diagnosis: 


UNA, immunotherapy for metastatic NSCLC





Pt seen in f/u, he states "I feel better today then I have in 2 weeks", no new 

physical c/o on a 10 point ROS, he states he has no trouble breathing with O2 

off.  He is ambulating in the room, says he has an appetite today 





Objective





- Vital Signs


Vital signs: 


 Vital Signs











Temp  97.9 F   11/14/18 11:45


 


Pulse  68   11/14/18 11:45


 


Resp  18   11/14/18 11:45


 


BP  106/68   11/14/18 11:45


 


Pulse Ox  92 L  11/14/18 11:45








 Intake & Output











 11/13/18 11/14/18 11/14/18





 18:59 06:59 18:59


 


Intake Total 100 740 


 


Balance 100 740 


 


Intake:   


 


  Intake, IV Titration 100 150 





  Amount   


 


    Piperacillin-Tazobactam 3 100 150 





    .375 gm In Sodium   





    Chloride 0.9% 100 ml @ 25   





    mls/hr IVPB Q8HR [facility] Rx#   





    :[id number]   


 


  Oral  590 


 


Other:   


 


  Voiding Method Toilet Toilet Toilet





 Urinal Urinal 


 


  # Voids  2 3


 


  # Bowel Movements  1 














- Constitutional


Constitutional Comment(s): 





pt looks thinner and more pale then I have seen in the past


General appearance: Present: average body habitus, cooperative





- EENT


Eyes: Present: anicteric sclerae


ENT: Present: normal oropharynx





- Respiratory


Respiratory: bilateral: rales (soft, in lower lobes)





- Cardiovascular


Heart sounds: normal: S1, S2





- Peripheral edema


  ** foot


Peripheral Edema: bilateral: Trace





- Gastrointestinal


General gastrointestinal: Present: normal bowel sounds, soft





- Integumentary


Integumentary: Present: pale





- Neurologic


Neurologic: Present: CNII-XII intact





- Musculoskeletal


Musculoskeletal: Present: strength equal bilaterally





- Psychiatric


Psychiatric: Present: A&O x's 3, appropriate affect, intact judgment & insight





- Labs


CBC & Chem 7: 


 11/13/18 07:54





 11/13/18 07:54


Labs: 


 Abnormal Lab Results - Last 24 Hours (Table)











  11/13/18 11/14/18 11/14/18 Range/Units





  20:04 07:17 11:29 


 


POC Glucose (mg/dL)  171 H  291 H  409 H  (75-99)  mg/dL








 Microbiology - Last 24 Hours (Table)











 11/10/18 17:17 Blood Culture - Preliminary





 Blood    No Growth after 72 hours














- Imaging and Cardiology


Chest x-ray: report reviewed





ECHO report reviewed





Assessment and Plan


(1) Non-small cell lung cancer with metastasis


Narrative/Plan: 


Pt has been treated with 2 cycles of nivolumab with complications after both 

cycles.  


Pt will f/u with Dr. Beauchamp who will have to discuss case with pt  in [place] to 

see how they would like to proceed with therapy from here.  


Current Visit: Yes   Status: Acute   Priority: High   Code(s): C34.90 - 

MALIGNANT NEOPLASM OF UNSP PART OF UNSP BRONCHUS OR LUNG   SNOMED Code(s): 

989329711


   





(2) Pneumonitis


Narrative/Plan: 


Due to lack of response of pulmonary infection treatment and clinical 

presentation of pt steroids were initiated last night.  Suspect immunotherapy 

pneumonitis.  Will see how pt pulmonary status does over they next 24-48 hours.

    


Current Visit: Yes   Status: Acute   Priority: High   Code(s): J18.9 - PNEUMONIA

, UNSPECIFIED ORGANISM   SNOMED Code(s): 079656745


   


Plan: 





Doctor attests:I have seen and examined patient, performed H&P, developed 

assessment and plan, discussed with dictator, agree with plan as dictated, 

documented as a scribe

## 2018-11-15 LAB
ANION GAP SERPL CALC-SCNC: 12 MMOL/L
BASOPHILS # BLD AUTO: 0 K/UL (ref 0–0.2)
BASOPHILS NFR BLD AUTO: 0 %
BUN SERPL-SCNC: 21 MG/DL (ref 9–20)
CALCIUM SPEC-MCNC: 8.3 MG/DL (ref 8.4–10.2)
CHLORIDE SERPL-SCNC: 100 MMOL/L (ref 98–107)
CO2 SERPL-SCNC: 22 MMOL/L (ref 22–30)
EOSINOPHIL # BLD AUTO: 0 K/UL (ref 0–0.7)
EOSINOPHIL NFR BLD AUTO: 0 %
ERYTHROCYTE [DISTWIDTH] IN BLOOD BY AUTOMATED COUNT: 3.68 M/UL (ref 4.3–5.9)
ERYTHROCYTE [DISTWIDTH] IN BLOOD: 14.2 % (ref 11.5–15.5)
GLUCOSE BLD-MCNC: 155 MG/DL (ref 75–99)
GLUCOSE BLD-MCNC: 174 MG/DL (ref 75–99)
GLUCOSE BLD-MCNC: 180 MG/DL (ref 75–99)
GLUCOSE BLD-MCNC: 198 MG/DL (ref 75–99)
GLUCOSE SERPL-MCNC: 145 MG/DL (ref 74–99)
HCT VFR BLD AUTO: 34.2 % (ref 39–53)
HGB BLD-MCNC: 11.1 GM/DL (ref 13–17.5)
LYMPHOCYTES # SPEC AUTO: 0.4 K/UL (ref 1–4.8)
LYMPHOCYTES NFR SPEC AUTO: 4 %
MCH RBC QN AUTO: 30.2 PG (ref 25–35)
MCHC RBC AUTO-ENTMCNC: 32.6 G/DL (ref 31–37)
MCV RBC AUTO: 92.9 FL (ref 80–100)
MONOCYTES # BLD AUTO: 0.3 K/UL (ref 0–1)
MONOCYTES NFR BLD AUTO: 4 %
NEUTROPHILS # BLD AUTO: 7.3 K/UL (ref 1.3–7.7)
NEUTROPHILS NFR BLD AUTO: 91 %
PLATELET # BLD AUTO: 289 K/UL (ref 150–450)
POTASSIUM SERPL-SCNC: 4 MMOL/L (ref 3.5–5.1)
SODIUM SERPL-SCNC: 134 MMOL/L (ref 137–145)
WBC # BLD AUTO: 8 K/UL (ref 3.8–10.6)

## 2018-11-15 RX ADMIN — INSULIN ASPART SCH UNIT: 100 INJECTION, SOLUTION INTRAVENOUS; SUBCUTANEOUS at 19:47

## 2018-11-15 RX ADMIN — INSULIN DETEMIR SCH UNIT: 100 INJECTION, SOLUTION SUBCUTANEOUS at 09:53

## 2018-11-15 RX ADMIN — TAMSULOSIN HYDROCHLORIDE SCH MG: 0.4 CAPSULE ORAL at 19:40

## 2018-11-15 RX ADMIN — INSULIN ASPART SCH UNIT: 100 INJECTION, SOLUTION INTRAVENOUS; SUBCUTANEOUS at 12:56

## 2018-11-15 RX ADMIN — INSULIN ASPART SCH UNIT: 100 INJECTION, SOLUTION INTRAVENOUS; SUBCUTANEOUS at 17:55

## 2018-11-15 RX ADMIN — METHYLPREDNISOLONE SODIUM SUCCINATE SCH MG: 125 INJECTION, POWDER, FOR SOLUTION INTRAMUSCULAR; INTRAVENOUS at 17:02

## 2018-11-15 RX ADMIN — CYANOCOBALAMIN TAB 500 MCG SCH MCG: 500 TAB at 08:34

## 2018-11-15 RX ADMIN — TIMOLOL MALEATE SCH DROPS: 5 SOLUTION OPHTHALMIC at 08:35

## 2018-11-15 RX ADMIN — INSULIN ASPART SCH UNIT: 100 INJECTION, SOLUTION INTRAVENOUS; SUBCUTANEOUS at 08:22

## 2018-11-15 RX ADMIN — APIXABAN SCH MG: 5 TABLET, FILM COATED ORAL at 08:34

## 2018-11-15 RX ADMIN — FAMOTIDINE SCH MG: 20 TABLET, FILM COATED ORAL at 19:39

## 2018-11-15 RX ADMIN — INSULIN DETEMIR SCH UNIT: 100 INJECTION, SOLUTION SUBCUTANEOUS at 19:48

## 2018-11-15 RX ADMIN — PIPERACILLIN AND TAZOBACTAM SCH MLS/HR: 3; .375 INJECTION, POWDER, FOR SOLUTION INTRAVENOUS at 00:56

## 2018-11-15 RX ADMIN — METHYLPREDNISOLONE SODIUM SUCCINATE SCH MG: 125 INJECTION, POWDER, FOR SOLUTION INTRAMUSCULAR; INTRAVENOUS at 08:33

## 2018-11-15 RX ADMIN — METOPROLOL SUCCINATE SCH MG: 25 TABLET, EXTENDED RELEASE ORAL at 19:40

## 2018-11-15 RX ADMIN — APIXABAN SCH MG: 5 TABLET, FILM COATED ORAL at 19:39

## 2018-11-15 RX ADMIN — FAMOTIDINE SCH MG: 20 TABLET, FILM COATED ORAL at 08:35

## 2018-11-15 RX ADMIN — LACTOBACILLUS ACIDOPHILUS / LACTOBACILLUS BULGARICUS SCH EACH: 100 MILLION CFU STRENGTH GRANULES at 08:35

## 2018-11-15 RX ADMIN — PRAVASTATIN SODIUM SCH MG: 40 TABLET ORAL at 19:39

## 2018-11-15 NOTE — XR
EXAMINATION TYPE: XR chest 2V

 

DATE OF EXAM: 11/15/2018

 

COMPARISON: 11/13/2018

 

HISTORY: Shortness of breath

 

TECHNIQUE:  Frontal and lateral views of the chest are obtained.

 

FINDINGS:

 

Scattered senescent parenchymal changes noted. Hyperinflation compatible with COPD. 

 

Small right-sided pleural effusion persists. Reticulonodular trace persist as well bilaterally associ
ated areas of atelectasis and pulmonary venous congestion

 

Heart size is stable.

 

Mediastinal structures are stable and grossly unremarkable.

 

No evidence for hilar prominence.

 

Degenerative changes dorsal spine. 

 

IMPRESSION:

1. Overall no significant change. Correlate for CHF versus pneumonia.

## 2018-11-15 NOTE — P.PN
Subjective


Progress Note Date: 11/15/18


Principal diagnosis: 


UNA, immunotherapy for metastatic NSCLC





Patient seen today in follow-up.  He states breathing is stable, he continues 

on 3 L of O2 with improved oxygen saturation between 90-95%, patient does get 

short of breath with ambulation, able to recover in 1-2 minutes, denies fevers, 

productive cough, hemoptysis, no other complaints on a 14 point review of 

systems





Objective





- Vital Signs


Vital signs: 


 Vital Signs











Temp  96.9 F L  11/15/18 04:51


 


Pulse  67   11/15/18 04:51


 


Resp  16   11/15/18 04:51


 


BP  108/64   11/15/18 04:51


 


Pulse Ox  92 L  11/15/18 04:51








 Intake & Output











 11/14/18 11/15/18 11/15/18





 18:59 06:59 18:59


 


Intake Total  1010 


 


Output Total  600 


 


Balance  410 


 


Intake:   


 


  Oral  1010 


 


Output:   


 


  Urine  600 


 


Other:   


 


  Voiding Method Toilet Toilet Toilet


 


  # Voids 3 2 














- Constitutional


Constitutional Comment(s): 





Patient's color is improved today, more pink in his cheeks, better overall skin 

color


General appearance: Present: average body habitus, cooperative, no acute 

distress





- EENT


Eyes: Present: anicteric sclerae, EOMI





- Respiratory


Respiratory: bilateral: CTA, diminished





- Cardiovascular


Heart sounds: normal: S1, S2





- Peripheral edema


  ** leg


Peripheral Edema: bilateral: None





- Gastrointestinal


General gastrointestinal: Present: normal bowel sounds, soft





- Integumentary


Integumentary: Present: normal





- Neurologic


Neurologic: Present: CNII-XII intact





- Musculoskeletal


Musculoskeletal: Present: generalized weakness





- Psychiatric


Psychiatric: Present: A&O x's 3, appropriate affect, intact judgment & insight





- Labs


CBC & Chem 7: 


 11/15/18 07:05





 11/15/18 07:05


Labs: 


 Abnormal Lab Results - Last 24 Hours (Table)











  11/14/18 11/14/18 11/14/18 Range/Units





  11:29 17:20 20:11 


 


RBC     (4.30-5.90)  m/uL


 


Hgb     (13.0-17.5)  gm/dL


 


Hct     (39.0-53.0)  %


 


Lymphocytes #     (1.0-4.8)  k/uL


 


Sodium     (137-145)  mmol/L


 


BUN     (9-20)  mg/dL


 


Glucose     (74-99)  mg/dL


 


POC Glucose (mg/dL)  409 H  288 H  216 H  (75-99)  mg/dL


 


Calcium     (8.4-10.2)  mg/dL














  11/15/18 11/15/18 11/15/18 Range/Units





  07:05 07:05 07:07 


 


RBC  3.68 L    (4.30-5.90)  m/uL


 


Hgb  11.1 L    (13.0-17.5)  gm/dL


 


Hct  34.2 L    (39.0-53.0)  %


 


Lymphocytes #  0.4 L    (1.0-4.8)  k/uL


 


Sodium   134 L   (137-145)  mmol/L


 


BUN   21 H   (9-20)  mg/dL


 


Glucose   145 H   (74-99)  mg/dL


 


POC Glucose (mg/dL)    155 H  (75-99)  mg/dL


 


Calcium   8.3 L   (8.4-10.2)  mg/dL








 Microbiology - Last 24 Hours (Table)











 11/10/18 17:17 Blood Culture - Preliminary





 Blood    No Growth after 96 hours














- Imaging and Cardiology


Chest x-ray: report reviewed





Assessment and Plan


(1) Non-small cell lung cancer with metastasis


Narrative/Plan: 


Pt has been treated with 2 cycles of nivolumab with complications after both 

cycles.  





Plan is for tapering steroids, f/u with Dr. Beauchamp in 2-3 weeks.  


Current Visit: Yes   Status: Acute   Priority: High   Code(s): C34.90 - 

MALIGNANT NEOPLASM OF UNSP PART OF UNSP BRONCHUS OR LUNG   SNOMED Code(s): 

563388677


   





(2) Pneumonitis


Narrative/Plan: 


No changes noted on chest x-ray from today.  Pulmonary status on exam does 

appear to be slightly improved today. Continue steroids, continue to follow-up.


Current Visit: Yes   Status: Acute   Priority: High   Code(s): J18.9 - PNEUMONIA

, UNSPECIFIED ORGANISM   SNOMED Code(s): 744401151


   


Plan: 


Case was discussed briefly this morning with attending.  Agree with ongoing 

antibiotics at this time.  Agree with current plan of care as dictated.  We 

will continue to follow with you.

## 2018-11-16 VITALS — RESPIRATION RATE: 16 BRPM

## 2018-11-16 LAB
GLUCOSE BLD-MCNC: 117 MG/DL (ref 75–99)
GLUCOSE BLD-MCNC: 189 MG/DL (ref 75–99)
GLUCOSE BLD-MCNC: 197 MG/DL (ref 75–99)
GLUCOSE BLD-MCNC: 293 MG/DL (ref 75–99)

## 2018-11-16 RX ADMIN — PRAVASTATIN SODIUM SCH MG: 40 TABLET ORAL at 21:24

## 2018-11-16 RX ADMIN — METHYLPREDNISOLONE SODIUM SUCCINATE SCH MG: 125 INJECTION, POWDER, FOR SOLUTION INTRAMUSCULAR; INTRAVENOUS at 16:22

## 2018-11-16 RX ADMIN — METOPROLOL SUCCINATE SCH MG: 25 TABLET, EXTENDED RELEASE ORAL at 21:25

## 2018-11-16 RX ADMIN — INSULIN ASPART SCH UNIT: 100 INJECTION, SOLUTION INTRAVENOUS; SUBCUTANEOUS at 21:25

## 2018-11-16 RX ADMIN — METHYLPREDNISOLONE SODIUM SUCCINATE SCH MG: 125 INJECTION, POWDER, FOR SOLUTION INTRAMUSCULAR; INTRAVENOUS at 09:11

## 2018-11-16 RX ADMIN — FAMOTIDINE SCH MG: 20 TABLET, FILM COATED ORAL at 09:13

## 2018-11-16 RX ADMIN — METHYLPREDNISOLONE SODIUM SUCCINATE SCH MG: 125 INJECTION, POWDER, FOR SOLUTION INTRAMUSCULAR; INTRAVENOUS at 00:14

## 2018-11-16 RX ADMIN — CYANOCOBALAMIN TAB 500 MCG SCH MCG: 500 TAB at 09:12

## 2018-11-16 RX ADMIN — FAMOTIDINE SCH MG: 20 TABLET, FILM COATED ORAL at 21:24

## 2018-11-16 RX ADMIN — INSULIN ASPART SCH UNIT: 100 INJECTION, SOLUTION INTRAVENOUS; SUBCUTANEOUS at 17:45

## 2018-11-16 RX ADMIN — INSULIN ASPART SCH: 100 INJECTION, SOLUTION INTRAVENOUS; SUBCUTANEOUS at 07:37

## 2018-11-16 RX ADMIN — LACTOBACILLUS ACIDOPHILUS / LACTOBACILLUS BULGARICUS SCH EACH: 100 MILLION CFU STRENGTH GRANULES at 09:14

## 2018-11-16 RX ADMIN — INSULIN DETEMIR SCH UNIT: 100 INJECTION, SOLUTION SUBCUTANEOUS at 21:25

## 2018-11-16 RX ADMIN — INSULIN DETEMIR SCH UNIT: 100 INJECTION, SOLUTION SUBCUTANEOUS at 09:13

## 2018-11-16 RX ADMIN — APIXABAN SCH MG: 5 TABLET, FILM COATED ORAL at 09:12

## 2018-11-16 RX ADMIN — TAMSULOSIN HYDROCHLORIDE SCH MG: 0.4 CAPSULE ORAL at 21:24

## 2018-11-16 RX ADMIN — APIXABAN SCH MG: 5 TABLET, FILM COATED ORAL at 21:24

## 2018-11-16 RX ADMIN — TIMOLOL MALEATE SCH DROPS: 5 SOLUTION OPHTHALMIC at 11:05

## 2018-11-16 RX ADMIN — INSULIN ASPART SCH UNIT: 100 INJECTION, SOLUTION INTRAVENOUS; SUBCUTANEOUS at 13:02

## 2018-11-17 VITALS — HEART RATE: 67 BPM | SYSTOLIC BLOOD PRESSURE: 115 MMHG | TEMPERATURE: 97.4 F | DIASTOLIC BLOOD PRESSURE: 62 MMHG

## 2018-11-17 LAB
ALBUMIN SERPL-MCNC: 3.1 G/DL (ref 3.5–5)
ALP SERPL-CCNC: 122 U/L (ref 38–126)
ALT SERPL-CCNC: 68 U/L (ref 21–72)
ANION GAP SERPL CALC-SCNC: 11 MMOL/L
AST SERPL-CCNC: 38 U/L (ref 17–59)
BASOPHILS # BLD AUTO: 0 K/UL (ref 0–0.2)
BASOPHILS NFR BLD AUTO: 0 %
BUN SERPL-SCNC: 28 MG/DL (ref 9–20)
CALCIUM SPEC-MCNC: 8.7 MG/DL (ref 8.4–10.2)
CHLORIDE SERPL-SCNC: 99 MMOL/L (ref 98–107)
CO2 SERPL-SCNC: 23 MMOL/L (ref 22–30)
EOSINOPHIL # BLD AUTO: 0 K/UL (ref 0–0.7)
EOSINOPHIL NFR BLD AUTO: 0 %
ERYTHROCYTE [DISTWIDTH] IN BLOOD BY AUTOMATED COUNT: 3.62 M/UL (ref 4.3–5.9)
ERYTHROCYTE [DISTWIDTH] IN BLOOD: 14.5 % (ref 11.5–15.5)
GLUCOSE BLD-MCNC: 235 MG/DL (ref 75–99)
GLUCOSE BLD-MCNC: 98 MG/DL (ref 75–99)
GLUCOSE SERPL-MCNC: 233 MG/DL (ref 74–99)
HCT VFR BLD AUTO: 33.8 % (ref 39–53)
HGB BLD-MCNC: 10.9 GM/DL (ref 13–17.5)
LYMPHOCYTES # SPEC AUTO: 0.3 K/UL (ref 1–4.8)
LYMPHOCYTES NFR SPEC AUTO: 3 %
MCH RBC QN AUTO: 30.2 PG (ref 25–35)
MCHC RBC AUTO-ENTMCNC: 32.3 G/DL (ref 31–37)
MCV RBC AUTO: 93.5 FL (ref 80–100)
MONOCYTES # BLD AUTO: 0.4 K/UL (ref 0–1)
MONOCYTES NFR BLD AUTO: 4 %
NEUTROPHILS # BLD AUTO: 9.4 K/UL (ref 1.3–7.7)
NEUTROPHILS NFR BLD AUTO: 92 %
PLATELET # BLD AUTO: 299 K/UL (ref 150–450)
POTASSIUM SERPL-SCNC: 4.9 MMOL/L (ref 3.5–5.1)
PROT SERPL-MCNC: 5.7 G/DL (ref 6.3–8.2)
SODIUM SERPL-SCNC: 133 MMOL/L (ref 137–145)
WBC # BLD AUTO: 10.3 K/UL (ref 3.8–10.6)

## 2018-11-17 RX ADMIN — METHYLPREDNISOLONE SODIUM SUCCINATE SCH MG: 125 INJECTION, POWDER, FOR SOLUTION INTRAMUSCULAR; INTRAVENOUS at 00:10

## 2018-11-17 RX ADMIN — TIMOLOL MALEATE SCH DROPS: 5 SOLUTION OPHTHALMIC at 08:21

## 2018-11-17 RX ADMIN — CYANOCOBALAMIN TAB 500 MCG SCH MCG: 500 TAB at 08:08

## 2018-11-17 RX ADMIN — INSULIN ASPART SCH: 100 INJECTION, SOLUTION INTRAVENOUS; SUBCUTANEOUS at 07:53

## 2018-11-17 RX ADMIN — FAMOTIDINE SCH MG: 20 TABLET, FILM COATED ORAL at 08:09

## 2018-11-17 RX ADMIN — APIXABAN SCH MG: 5 TABLET, FILM COATED ORAL at 08:08

## 2018-11-17 RX ADMIN — INSULIN DETEMIR SCH UNIT: 100 INJECTION, SOLUTION SUBCUTANEOUS at 09:46

## 2018-11-17 RX ADMIN — INSULIN ASPART SCH UNIT: 100 INJECTION, SOLUTION INTRAVENOUS; SUBCUTANEOUS at 13:00

## 2018-11-17 RX ADMIN — METHYLPREDNISOLONE SODIUM SUCCINATE SCH MG: 125 INJECTION, POWDER, FOR SOLUTION INTRAMUSCULAR; INTRAVENOUS at 08:09

## 2018-11-17 RX ADMIN — LACTOBACILLUS ACIDOPHILUS / LACTOBACILLUS BULGARICUS SCH EACH: 100 MILLION CFU STRENGTH GRANULES at 08:10

## 2018-11-17 NOTE — P.PN
Subjective


Progress Note Date: 11/17/18


Principal diagnosis: 





Pneumonitis





Patient seen and evaluated today with wife and son at bedside. He is breathing 

well while sitting, although still desaturates with increasing activity. He has 

improved since admission





Objective





- Vital Signs


Vital signs: 


 Vital Signs











Temp  97.4 F L  11/17/18 05:00


 


Pulse  67   11/17/18 05:00


 


Resp  16   11/17/18 05:00


 


BP  115/62   11/17/18 05:00


 


Pulse Ox  93 L  11/17/18 10:51








 Intake & Output











 11/17/18 11/17/18 11/18/18





 06:59 18:59 06:59


 


Weight 73.482 kg  


 


Other:   


 


  Voiding Method Toilet Toilet 





 Urinal Urinal 


 


  # Voids 2  














- Exam





 Constitutional


Constitutional Comment(s): 





Patient's color is improved today, more pink in his cheeks, better overall skin 

color


General appearance: Present: average body habitus, cooperative, no acute 

distress





- EENT


Eyes: Present: anicteric sclerae, EOMI





- Respiratory


Respiratory: bilateral: CTA, diminished





- Cardiovascular


Heart sounds: normal: S1, S2





- Peripheral edema


  ** leg


Peripheral Edema: bilateral: None





- Gastrointestinal


General gastrointestinal: Present: normal bowel sounds, soft





- Integumentary


Integumentary: Present: normal





- Neurologic


Neurologic: Present: CNII-XII intact





- Musculoskeletal


Musculoskeletal: Present: generalized weakness





- Psychiatric


Psychiatric: Present: A&O x's 3, appropriate affect, intact judgment & insight





- Labs


CBC & Chem 7: 


 11/17/18 11:52





 11/17/18 11:52


Labs: 


 Abnormal Lab Results - Last 24 Hours (Table)











  11/17/18 11/17/18 11/17/18 Range/Units





  11:52 11:52 12:06 


 


RBC  3.62 L    (4.30-5.90)  m/uL


 


Hgb  10.9 L    (13.0-17.5)  gm/dL


 


Hct  33.8 L    (39.0-53.0)  %


 


Neutrophils #  9.4 H    (1.3-7.7)  k/uL


 


Lymphocytes #  0.3 L    (1.0-4.8)  k/uL


 


Sodium   133 L   (137-145)  mmol/L


 


BUN   28 H   (9-20)  mg/dL


 


Glucose   233 H   (74-99)  mg/dL


 


POC Glucose (mg/dL)    235 H  (75-99)  mg/dL


 


Total Protein   5.7 L   (6.3-8.2)  g/dL


 


Albumin   3.1 L   (3.5-5.0)  g/dL








 Microbiology - Last 24 Hours (Table)











 11/10/18 17:17 Blood Culture - Final





 Blood    No Growth after 144 hours














Assessment and Plan


Plan: 





(1) Non-small cell lung cancer with metastasis


Narrative/Plan: 


Pt has been treated with 2 cycles of nivolumab with complications after both 

cycles.  





Plan is for tapering steroids, f/u with Dr. Weston in 2-3 weeks.  


 - He will require a long steroid taper with high dose 1mg./kg pred


 - Discharge home on prednisone 60mg po daily and PPI, he will continue this 

dose till 11/26/18, then decrease to 40mg. He remain on this dose until he is 

seen in office by Dr. Weston the following week


Current Visit: Yes   Status: Acute   Priority: High   Code(s): C34.90 - 

MALIGNANT NEOPLASM OF UNSP PART OF UNSP BRONCHUS OR LUNG   SNOMED Code(s): 

707672821


   





(2) Pneumonitis


Narrative/Plan: 


No changes noted on chest x-ray from today.  Pulmonary status on exam does 

appear to be slightly improved today. Continue steroids, continue to follow-up.


Current Visit: Yes   Status: Acute   Priority: High   Code(s): J18.9 - PNEUMONIA

, UNSPECIFIED ORGANISM   SNOMED Code(s): 476721521


   


Plan: 


Discussed case with primary team, ok for discharge and follow-up will be made


Discussed plan and discontinuation of Opdivo with patient as well as potential 

risks and benefits of high dose long term use steroids, including but not 

limited to hyperglycemia, cortisol insufficiency, GI etiology, and weight gain. 





Physician Attestation: I have completed the full history and physical of this 

patient and agree with above dictation by ollie romero, dictated as a scribe

## 2018-11-17 NOTE — P.DS
Providers


Date of admission: 


11/10/18 19:24





Expected date of discharge: 11/17/18


Attending physician: 


Seth Sanchez





Consults: 





 





11/10/18 19:18


Consult Physician Routine 


   Consulting Provider: Willie Beauchamp


   Consult Reason/Comments: Stage IV lung cancer, no effusion, pneumonia


   Do you want consulting provider notified?: Already Contacted











Primary care physician: 


Seth Sanchez





[facility]: 





This 73-year-old gentleman was admitted to the hospital with complaints of 

shortness of breath.  The patient was feeling poorly.  He has a history of 

carcinoma of the lung stage IV non-small cell CA.  He has been on chemotherapy 

and subsequently immunotherapy.  Since his been on the immunotherapy, OPDIVA he 

hasn't felt the best.  The he was recently treated with steroids for a few days 

after the initial infusion.  He is on a different regimen of his infusions.  He 

was instructed not to take any steroids.  This was specific instructions from 

the oncologist.  Patient presents to the emergency room because of the 

shortness of breath upon referral by the oncologist.  I did see the patient in 

the emergency room chest x-ray was unremarkable except for small right pleural 

effusion and minimal infiltration of the parenchyma right base.  This is felt 

to be the residual area of his malignancy.  He had a CAT scan of the chest done 

to rule out a pulmonary embolus.  It did reveal some groundglass infiltrates at 

the bases.  A suspicion of possible infective process was entertained.  Patient 

was started on antibiotics.  He did not have a white count.  The patient had a 

temperature 100F in the emergency room and that is the highest it has been.  

Patient's BNP was elevated and a repeat chest x-ray on the next day suggested 

possible fluid overload.  Patient was given Lasix with no improvement.  Patient 

continued to have mild hypoxia and desaturated with activity and having 

dyspnea.  Clinically there was no suggestion of CHF.  He did not evolve into 

any signs symptoms to suggest infection.  At this point it was decided that the 

patient had an inflammatory response in the lungs due to the immunotherapeutic 

agent.  In view of this patient was started on steroids.  This really helped 

him feel better.  His appetite improved his breathing.  He does have mild 

hypoxia with activity.  His O2 saturations are 93% on room air and 87% with 

activity.  In view of this patient is going to be placed on oxygen at home.  He'

ll continue steroids.  Dr. Beauchamp recommended patient to continue prednisone 

at 20 mg 3 times a day.  Also during the hospitalization and had an 

echocardiogram which revealed normal left ventricular function.  He does have 

history of atrial fibrillation paroxysmal and he was noted to be in A. fib at 

number of times.  In view of this he has been placed on Eliquis.  No unusual 

bleeding.  He has a history of previous mitral valve prolapse repair. Patient 

has been instructed regarding all his medications.  Advised not to take aspirin 

or Aleve.  Patient will follow up with oncologist.  His blood sugars were 

elevated due to steroids and he has been placed on insulin regimen and advised 

adjustment depending on blood sugars checked 4 times a day before meals and at 

bedtime.  Patient has been previously instructed on management of hypoglycemia.

  Pneumonia was ruled out.  CHF was ruled out.  Diet at discharge will be 

carbohydrate consistent





My final diagnosis.


1.  Acute inflammatory response lungs secondary to immunotherapeutic drug


2.  Acute respiratory failure secondary to hypoxia


3.  Carcinoma lung non-small cell CA stage IV


4.  Paroxysmal atrial fibrillation


5.  Diabetes mellitus with no complications


6.  Hyperglycemia secondary to steroid use


7.  Hyponatremia SIADH/


Patient Condition at Discharge: Stable





Plan - Discharge Summary


Discharge Rx Participant: No


New Discharge Prescriptions: 


New


   Apixaban [Eliquis] 5 mg PO BID #60 tab


   Famotidine [Pepcid] 20 mg PO BID  tab


   Insulin Aspart [NovoLOG (formulary)] 10 unit SQ TID 30 Days #5 pen


   Insulin Detemir [Levemir] 7 unit SQ HS #1 pen


   Insulin Detemir [Levemir] 10 unit SQ 0900 30 Days #1 pen


   predniSONE 20 mg PO TID #20 tab





Continue


   Cyanocobalamin (Vitamin B-12) [Vitamin B-12] 1,000 mcg PO DAILY


   Cholecalciferol (Vitamin D3) [Vitamin D3] 2,000 unit PO DAILY





Discontinued


   Aspirin [Adult Low Dose Aspirin EC] 81 mg PO HS


   Naproxen Sodium [Aleve] 220 mg PO DAILY PRN


     PRN Reason: Pain





No Action


   Timolol 0.5% Ophth Soln [Timoptic 0.5% Ophth Soln] 1 drop BOTH EYES DAILY


   Pravastatin Sodium [Pravachol] 40 mg PO HS


   metFORMIN HCL [Glucophage] 500 mg PO BID


   Calcium Carbonate [Calcium] 600 mg PO BID


   traMADol HCL [Ultram] 50 mg PO DAILY PRN


     PRN Reason: Pain


   Acetaminophen Tab [Tylenol] 1,000 mg PO Q6HR PRN


     PRN Reason: Pain


   Deidra Dephilus 1 tab PO DAILY


   Bisacodyl [Dulcolax] 5 mg PO DAILY PRN


     PRN Reason: Constipation


   Tamsulosin [Flomax] 0.4 mg PO HS


   Metoprolol Succinate (ER) [Toprol XL] 25 mg PO HS


Discharge Medication List





Pravastatin Sodium [Pravachol] 40 mg PO HS 09/14/17 [History]


Timolol 0.5% Ophth Soln [Timoptic 0.5% Ophth Soln] 1 drop BOTH EYES DAILY 09/14/ 17 [History]


metFORMIN HCL [Glucophage] 500 mg PO BID 09/14/17 [History]


Calcium Carbonate [Calcium] 600 mg PO BID 07/23/18 [History]


Cholecalciferol (Vitamin D3) [Vitamin D3] 2,000 unit PO DAILY 07/23/18 [History]


Cyanocobalamin (Vitamin B-12) [Vitamin B-12] 1,000 mcg PO DAILY 07/23/18 [

History]


Acetaminophen Tab [Tylenol] 1,000 mg PO Q6HR PRN 11/10/18 [History]


Bisacodyl [Dulcolax] 5 mg PO DAILY PRN 11/10/18 [History]


Metoprolol Succinate (ER) [Toprol XL] 25 mg PO HS 11/10/18 [History]


Deidra Dephilus 1 tab PO DAILY 11/10/18 [History]


Tamsulosin [Flomax] 0.4 mg PO HS 11/10/18 [History]


traMADol HCL [Ultram] 50 mg PO DAILY PRN 11/10/18 [History]


Apixaban [Eliquis] 5 mg PO BID #60 tab 11/17/18 [Rx]


Famotidine [Pepcid] 20 mg PO BID  tab 11/17/18 [Rx]


Insulin Aspart [NovoLOG (formulary)] 10 unit SQ TID 30 Days #5 pen 11/17/18 [Rx]


Insulin Detemir [Levemir] 7 unit SQ HS #1 pen 11/17/18 [Rx]


Insulin Detemir [Levemir] 10 unit SQ 0900 30 Days #1 pen 11/17/18 [Rx]


predniSONE 20 mg PO TID #20 tab 11/17/18 [Rx]








Follow up Appointment(s)/Referral(s): 


Seth Sanchez MD [Primary Care Provider] - 1-2 days


[facility] Visiting Nurse, [NON-STAFF] - 1 Week


Activity/Diet/Wound Care/Special Instructions: 


[facility] - [phone] - Oxygen - will deliver portable to bedside and 

concentrator at home

## 2018-11-18 NOTE — P.PN
Subjective


Progress Note Date: 11/16/18





  The patient continues to improve slowly.  He feels less short of breath 

subjectively.  He is now saturating in the 90s on room air.  Endurance was 

still reduced





Objective





- Vital Signs


Vital signs: 


 Vital Signs











Temp  97.4 F L  11/17/18 05:00


 


Pulse  67   11/17/18 05:00


 


Resp  16   11/17/18 05:00


 


BP  115/62   11/17/18 05:00


 


Pulse Ox  93 L  11/17/18 10:51








 Intake & Output











 11/17/18 11/18/18 11/18/18





 18:59 06:59 18:59


 


Other:   


 


  Voiding Method Toilet  





 Urinal  














- Constitutional


General appearance: Present: no acute distress





- EENT


Eyes: Present: EOMI


ENT: Present: hearing grossly normal, normal oropharynx





- Respiratory


Respiratory: bilateral: rales (minimal, at bases)





- Cardiovascular


Rhythm: regular


Heart sounds: normal: S1, S2





- Gastrointestinal


General gastrointestinal: Present: normal bowel sounds, soft





- Integumentary


Integumentary: Present: normal





- Neurologic


Neurologic: Present: CNII-XII intact





- Musculoskeletal


Musculoskeletal: Present: generalized weakness, strength equal bilaterally





- Psychiatric


Psychiatric: Present: A&O x's 3, appropriate affect





- Labs


CBC & Chem 7: 


 11/17/18 11:52





 11/17/18 11:52





Assessment and Plan


(1) Acute respiratory failure


Narrative/Plan: 


   The patient appears to have had some incremental improvement.  He is now off 

oxygen, and is saturating in the 90% range.  He feels better subjectively.


  The clinical course at this time is most suggestive of immunotherapy related  

pneumonitis.  The patient will be switched to oral prednisone tomorrow, and 

will then continue on a slow taper.


Status: Acute   Code(s): J96.00 - ACUTE RESPIRATORY FAILURE, UNSP W HYPOXIA OR 

HYPERCAPNIA   SNOMED Code(s): 74541220


   





(2) Lung cancer


Narrative/Plan: 


As noted, the clinical course is suggestive of side effects related to current 

treatment.  He was advised that this is not indicated that the current 

treatment is ineffective.  However given probable reactions, we will need to 

change therapy.  He will discuss the same as an outpatient, on follow-up with 

Dr. Beauchamp.


Status: Acute   Code(s): C34.90 - MALIGNANT NEOPLASM OF UNSP PART OF UNSP 

BRONCHUS OR LUNG   SNOMED Code(s): 887501241

## 2018-12-26 ENCOUNTER — HOSPITAL ENCOUNTER (OUTPATIENT)
Dept: HOSPITAL 47 - RADXRMAIN | Age: 73
End: 2018-12-26
Attending: INTERNAL MEDICINE
Payer: MEDICARE

## 2018-12-26 DIAGNOSIS — G44.52: ICD-10-CM

## 2018-12-26 DIAGNOSIS — E11.9: ICD-10-CM

## 2018-12-26 DIAGNOSIS — I48.91: ICD-10-CM

## 2018-12-26 DIAGNOSIS — C34.31: Primary | ICD-10-CM

## 2018-12-26 PROCEDURE — 71046 X-RAY EXAM CHEST 2 VIEWS: CPT

## 2018-12-27 NOTE — XR
EXAMINATION TYPE: XR chest 2V

 

DATE OF EXAM: 12/26/2018

 

COMPARISON: 11/15/2018

 

HISTORY: C34.31 Carcinoma of lung

 

 

FINDINGS:

Noted is pulmonary venous congestion with scattered infiltrates. Increasing infiltrate right medial l
umair base. 

 

There is also cardiomegaly and small effusions.

 

IMPRESSION:

 

Findings compatible with congestive failure.  Infiltrates of other etiology are not excluded.  

Clinical correlation and progress studies are recommended.

## 2025-04-04 NOTE — PN
PROGRESS NOTE



ATTENDING PHYSICIAN:

Dr. CRISTINA Sanchez.



CHIEF COMPLAINT:

Re-evaluation.



HISTORY OF PRESENT ILLNESS:

This 73-year-old gentleman was admitted to the hospital because of shortness of breath

and evidence of infiltrates in the lung.  Initially was suspected the patient may have

CHF or pneumonia.  However, as the patient's condition was evaluated, it was felt that

the patient does not have pneumonia, did not behave like congestive cardiac failure and

did not respond much to Lasix.  The patient's picture is more suggestive of

inflammatory pneumonitis related to chemotherapy agent.  The patient is on Opdivo.

This has been held,  his infusion was held today.  The patient has been placed on

steroids.  After discussion with his oncologist, Dr. Santos.  The patient seems to be

feeling better.  He is somewhat improved.  His blood sugars are better controlled

today.  He is feeling improved and looks better.  His dyspnea is still present.  Chest

x-ray done today is basically stable.  No worsening.



REVIEW OF SYSTEMS:

Neuro:  Denies any headaches, dizziness.  Psych:  No anxiety.  Cardiac:  No chest pain,

angina or palpitation.  Respiratory:  No shortness of breath.  Does have some dyspnea

on exertion, minimal cough with no sputum production.  Gastroenterology:  No nausea,

vomiting, abdominal pain.  Did have minimal diarrhea and he feels this is from the

Glucerna.

EXTREMITIES: Denies pain, edema.  Constitutional: No fever,  chills. No night sweats

last night.



PHYSICAL EXAMINATION:

Pleasant gentleman at present in no distress.  Vital signs reveals temperature earlier

was 96.9, pulse 67, respirations 16, blood pressure 108/64.

HEENT:  Normocephalic.

NECK:  Supple.  No JVD.  No supraclavicular lymphadenopathy.  Chest examination:

Minimal dullness to percussion right base.  Otherwise, lung fields are clear.

Cardiac:  No basal crackles appreciated today.

CARDIAC:  Normal S1, S2 with no gallop.  Irregular rhythm reported at times.

ABDOMEN:  Soft.  Bowel sounds present.  Extremities:  Reveal no edema.

Neurologically:  Awake, alert, oriented x3 with well-coordinated movements.



LABORATORY ASSESSMENT:

CBC shows a hemoglobin of 11.1, white count 8.0, no left shift, platelets 289.

Electrolytes reveal sodium up to 134, BUN 21, glucose 145.  Calcium 8.3.



ASSESSMENT:

1. 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

2. Hyponatremia, improving.

3. History of carcinoma of the lung.

4. Mild chronic anemia.

5. Diabetes mellitus with elevated blood sugars secondary to steroid use.



PLAN:

Continue present medical regimen.  The patient's condition is discussed with the

patient.  Prognosis remains guarded.  Reviewed patient's labs and x-ray.  If the

patient's condition remains stable, possible discharge in the next 24 to 48 hours.





MMODL / IJN: [id number] / Job#: [id number]
PROGRESS NOTE



ATTENDING PHYSICIAN:

Dr. CRISTINA Sanchez.



CHIEF COMPLAINT:

Re-evaluation.



HISTORY OF PRESENT ILLNESS:

This is a 73-year-old gentleman admitted to the hospital because of shortness of

breath.  The patient initially felt may have a possible pneumonia.  However, this

patient is less likely now.  The patient also was treated for to see if he had any

evidence of congestive heart failure, but did not seem to respond too well to

treatment.  An echocardiogram showed normal function.  In view of this, the conclusion

is that the patient has an inflammatory response to the chemotherapy for CA of the

lung.  In view of this, patient has been started on steroids yesterday.  He did have

some excessive night sweating.  The patient otherwise feels better today.  His appetite

is improved.  He is concerned about his blood sugars running high with the steroids.  I

reassured him that we will be covering that with insulin.  The patient's general

condition discussed otherwise with the patient.  Prognosis is guarded.



REVIEW OF SYSTEMS:

Neuro:  Denies any headaches or dizziness.  Psych:  No anxiety, some apprehension.

Cardiac:  No chest pain, angina, palpitations.  Respiratory:  No shortness of breath,

cough.  GI:  No nausea, vomiting, abdominal pain, diarrhea.  :  No symptoms of

dysuria or hematuria, urgency or frequency.  Extremities:  No pain.  Constitutional: No

fever or chills.  Respiratory:  Denies shortness of breath.  Does have dyspnea though

on exertion.  Has minimal cough.  No sputum production.



PHYSICAL EXAMINATION:

Vital signs reveals temperature 97.5, pulse 65, respirations 18, blood pressure 125/63,

pulse ox 96% on 3 L.

HEENT:  Normocephalic.  Neck no JVD.

CHEST:  Clear to auscultation actually with minimal dullness on percussion to the right

base with decreased air flow right base.  Cardiac:  Normal S1, S2 with no gallops.

Regular rhythm this morning.  Systolic murmur 2/6 left sternal border.

ABDOMEN:  Soft.  Bowel sounds present.  Extremities reveal no edema.

NEUROLOGIC:  Awake, alert, oriented with well-coordinated movements.



LABORATORY ASSESSMENT:

With an Accu-Chek of 291.



ASSESSMENT:

1. Carcinoma of the lung.

2. Pulmonary inflammatory response to chemotherapy.

3. Elevated blood sugars due to the steroids.

4. Underlying history of diabetes mellitus.

5. Right pleural effusion.



PLAN:

Continue present medical regimen.  Patient's condition discussed with the patient.

Prognosis is guarded.  The patient to receive Levemir 10 units twice a day along with

coverage with NovoLog.  The patient may require to be placed on IV insulin if his blood

sugars continue to stay high.  Prognosis remains guarded.





BENNIE / LIDIA: [id number] / Job#: [id number]
PROGRESS NOTE



ATTENDING PHYSICIAN:

Dr. CRISTINA Sanchez.



PROGRESS NOTE DATED:

11/11/2018.



CHIEF COMPLAINT:

Re-evaluation.



HISTORY OF PRESENT ILLNESS:

This is a pleasant 73-year-old gentleman admitted to the hospital because of some

malaise, shortness of breath. Minimal cough.  No chest pain.  The patient has a history

of carcinoma of the lung, stage IV.  He was started on Zithromax and Rocephin last

night with the presumption of pneumonia, has some ground glass densities in the lower

lobes of the lungs.  The patient feels much better today.



REVIEW OF SYSTEMS:

NEURO: Denies any headaches, dizziness.

PSYCH: No anxiety.

CARDIAC: Denies chest pain, angina, palpitations.

RESPIRATORY: Denies shortness of breath. Minimal cough.  No hemoptysis.

GI: No nausea, vomiting, abdominal pain, diarrhea.

: No symptoms of dysuria, hematuria, urgency, frequency.

EXTREMITIES: Denies pain, edema.

CONSTITUTIONAL: No fever or chills.



PHYSICAL EXAMINATION:

Pleasant gentleman at present in no distress.  Vital signs reveals temperature 98.4,

pulse 81, respirations 16, blood pressure 119/62, pulse ox 98% on room air.

HEENT:  Normocephalic.

NECK: No JVD.

CHEST:  Clear to auscultation with minimal decreased air flow right base.

CARDIAC:  Sounds S1, S2 with no gallop.  Systolic murmur 2/6 left sternal border.

ABDOMEN:  Soft.  Bowel sounds present.

Extremities reveal no edema.  No tenderness.

NEUROLOGICALLY: Awake, alert, oriented with well-coordinated movements.



LABORATORY ASSESSMENT:

CBC which showed white count of 3.3, hemoglobin 10.7, platelets 186.  Sodium up to 129,

normal renal function.  Glucose 144.  Calcium 8.0.



ASSESSMENT:

1. Shortness of breath, improved.

2. Possible pneumonia.

3. Diabetes mellitus.

4. History of cancer of the lung.



PLAN:

The patient at present is stable. Continue present medical regimen.  Patient's

condition discussed with the on-call oncologist.  She had some concern about elevated

BNP.  The patient has no clinical evidence of CHF.  However, we will repeat that and if

still elevated, check an echocardiogram tomorrow.  The patient's prognosis remains

guarded.  Continue present regimen meanwhile.





MMODL / IJN: [id number] / Job#: [id number]
PROGRESS NOTE



CHIEF COMPLAINT:

Re-evaluation.



HISTORY OF PRESENT ILLNESS:

A 73-year-old gentleman admitted to the hospital because of shortness of breath and low-

grade temperature of 100 degrees.  There was the only temperature noted.  The patient

has minimal dry cough.  The patient denies any other symptoms of palpitations, however,

the patient is noted to be in atrial fibrillation.  The patient's chest x-ray showed

some worsening, suggestive of interstitial edema.  Did get Lasix without much change.

This evening the patient's status was about the same.  I did re-evaluate him.  The

patient does have some left base crackles, otherwise the right lung has minimal

dullness to percussion.  Right lung appears fairly clear.  No gallops are noted.  The

patient's rhythm is irregular, heart rate varying from 89 to 120 range.  He is in

atrial fibrillation.



REVIEW OF SYSTEMS:

NEURO: Denies any headaches or dizziness.

PSYCH: No anxiety.

CARDIAC: Denies chest pain, angina, palpitation.

RESPIRATORY: Some shortness of breath with exertion. At rest he feels comfortable.

GI: No nausea or vomiting.  Appetite decreased. No abdominal pain, no diarrhea.  Did

have diarrhea yesterday which is resolved.

EXTREMITIES: Denies pain, edema.

CONSTITUTIONAL: No fevers or chills.



PHYSICAL EXAMINATION:

Pleasant gentleman, at present in no distress.

VITAL SIGNS: Temperature 98.4, pulse 80, respirations 17, blood pressure 132/83, pulse

ox 94% on 2 L.

HEENT:  Normocephalic.

NECK: No JVD.

CHEST EXAMINATION:  Minimal dullness to percussion right base.  The patient's left

bases with crackles.

CARDIAC:  Sounds S1, S2 with no gallop.  Systolic murmur 2/6 left sternal border.

ABDOMEN:  Soft.  Bowel sounds present.

EXTREMITIES: No edema.

NEUROLOGIC: Awake, alert, oriented x3 with well-coordinated movements.



LABORATORY ASSESSMENT:

Blood sugars are mildly elevated. BNP still elevated ______. Chest x-ray as mentioned

above.  Echocardiogram reveals good ejection fraction.



ASSESSMENT:

1. Dyspnea with abnormal chest, rule out infective process, in fact rule out

    inflammatory response to chemotherapy. Probably not congestive cardiac failure. The

    patient was given a dose of Lasix to see how he responds regarding respiratory

    status.  Continue antibiotics.  Change the Rocephin to Zosyn.  Asked Pulmonary to

    see the patient.

2. Hyponatremia, improving.

3. History of cancer of the lung.

4. Diabetes mellitus.

5. History of paroxysmal atrial fibrillation, at present atrial fibrillation. We will

    start the patient on Eliquis.

The patient's condition was discussed with the patient's daughter and wife. Prognosis

remains guarded.





MMODL / IJN: [id number] / Job#: [id number]
PROGRESS NOTE



CHIEF COMPLAINT:

Re-evaluation.



HISTORY OF PRESENT ILLNESS:

This 73-year-old gentleman admitted to the hospital because of shortness of breath.

The patient has a history of CA of the lung, status post chemotherapies.  The patient

had a temperature of 100 at the time of admission, however, has had no fever.  The

patient has cough with no sputum production.  No chest pain.  There is no shortness of

breath unless he moves around.  The patient does have mild hypoxia.  He also has mild

anemia and mild hyponatremia, which is stable.  The patient has history of diabetes

mellitus.  Blood sugar is mildly elevated. The patient's chest x-ray remains about the

same and stable.  The patient's chest x-ray suggested possible interstitial edema.  The

patient was treated with Lasix and did not show any significant dramatic change.  The

patient clinically does not have any signs of CHF.  He has no JVD.  Lungs are fairly

clear.  The patient has no edema.  The patient has no suggestion of significant

respiratory infection in the sense that patient has no fever, not bringing up much

sputum.  He does not appear sick with extensive changes in the lungs.  In view of this,

it is concluded that the patient probably has pulmonary inflammatory response to his

chemotherapeutic agent

Opdivo immunotherapy agent.  The patient was seen this morning and again we evaluated

this evening.  Vital signs have remained stable.  He did have some epistaxis.  The

patient is on oxygen and he is on Eliquis.  The patient denies any other symptoms of

bleeding at any other site.



REVIEW OF SYSTEMS:

NEURO: Denies any headaches, dizziness.

PSYCH: No anxiety, some apprehension.

CARDIAC: No chest pain, angina, palpitation.

RESPIRATORY: Denies shortness of breath.  Does have dyspnea with activity.  Has minimal

cough at times, but no sputum production.

GI: No nausea, vomiting. Appetite fair.  No abdominal pain.  No diarrhea.  No bowel

movement.

: No symptoms of dysuria or hematuria.

EXTREMITIES: No pain.

CONSTITUTIONAL: No fever, chills.



PHYSICAL EXAMINATION:

Pleasant gentleman at present in no distress.

Vital signs revealed temperature 97.8, pulse 96, respirations 17, blood pressure

127/68, pulse ox 91% on 2 L.

HEENT:  Normocephalic.

NECK: No JVD.

Chest examination is clear to auscultation with few crackles at the left base.  Some

dullness to percussion right base with mild decreased air flow.

CARDIAC:  Distant heart sounds, S1, S2 with no gallops.  Systolic murmur 2/6 left

sternal border.  Heart is regular at present _____.

ABDOMEN:  Soft.  No palpable masses.  Bowel sounds normal.

Extremities reveal no edema.

NEUROLOGICALLY: Awake, alert, oriented x3 with well-coordinated movements.



LABORATORY ASSESSMENT:

Hemoglobin 10.5, white count 3.4, neutrophils 2800. ESR is 93.  Sodium 129, potassium

4.4, chloride 93, CO2 at 25. BUN, creatinine normal.  Blood sugar 160.  BNP down to

2460.  Cultures so far no growth.



Chest x-ray no significant change according to radiologist.  Clinically, I suspect the

x-ray looks some better.



ASSESSMENT:

1. Respiratory failure.

2. Pneumonitis, suspect secondary immunotherapy.

3. Diabetes mellitus.

4. Paroxysmal atrial fibrillation.

5. History of mitral valve repair.



PLAN:

The patient is stable.  Continue present medical regimen.  I did discuss with Dr. Santos

from Oncology, who has also discussed with the patient's oncologist, Dr. Beauchamp and

they made the decision to put the patient on steroids.  The patient was consulted to

Pulmonary.  However, there was some misunderstanding between the doctor and the patient

and the physician elected not to see the patient. The patient's condition discussed

with the patient and spouse.  Prognosis guarded.





MMODL / CARLAN: [id number] / Job#: [id number]
PROGRESS NOTE



CHIEF COMPLAINT:

Re-evaluation.



HISTORY OF PRESENT ILLNESS:

This is a 73-year-old gentleman who was admitted to the hospital with shortness of

breath.  The patient's etiology was determined by exclusion of infective process,

congestive cardiac failure, pulmonary embolism.  It was finalized that the patient has

an inflammatory response to the immunotherapeutic agent for his carcinoma of the lung.

The patient does have stage IV carcinoma of the lung.  The patient since started on

steroids is feeling much better.  His overall functional status has improved, and in

fact today his breathing seems to have improved significantly.  He has not used oxygen

much during the day today.



REVIEW OF SYSTEMS:

NEURO: Denies any headaches, dizziness.

PSYCH: No anxiety.

CARDIAC: No chest pain, angina, palpitations.

RESPIRATORY: Denies shortness of breath. Some dyspnea on exertion but improved. Minimal

cough.

GI: No nausea, vomiting. Good appetite. No abdominal pain. No diarrhea.

: No symptoms of dysuria, hematuria.

EXTREMITIES:  No pain or edema.

CONSTITUTIONAL:  No fever or chills.



PHYSICAL EXAMINATION:

Pleasant gentleman, at present in no distress.

VITAL SIGNS: Temperature 97.6, pulse 77, respirations 18, blood pressure 141/82, pulse

ox 99% on room air. Repeat at 8:00 was 91% on room air.

HEENT:  Normocephalic.

NECK: No JVD.

CHEST:  Clear to auscultation with a few crackles at the base on the right side.  Mild

decreased air flow at the right base.

CARDIAC:  Normal S1, S2 with no gallops.  Occasional irregular beats.  The patient does

have atrial fibrillation, paroxysmal.

ABDOMEN:  Soft.  Bowel sounds active.

EXTREMITIES:  No edema.  No tenderness.

Neurologically awake, alert, oriented with well-coordinated movements.



LABORATORY ASSESSMENT:

Accu-Chek was 174 last night and 117 this morning.



ASSESSMENT:

1. Inflammatory lung reaction to immunotherapeutic agent.

2. Carcinoma of the lung.

3. Diabetes mellitus with fluctuating blood sugars due to steroid use.

4. Paroxysmal atrial fibrillation.



PLAN:

Continue present medical regimen.  Patient's condition was discussed with the patient.

Prognosis guarded.  If patient remains stable, the plan is to discharge him home

tomorrow.  He may require oxygen at home.  Will see how he does with ambulation today,

and if needed we will schedule for oxygen at home.  The patient will continue on oral

steroids and insulin.





MMODL / IJN: [id number] / Job#: [id number]
Chart(s)/Patient